# Patient Record
Sex: FEMALE | Race: ASIAN | NOT HISPANIC OR LATINO | URBAN - METROPOLITAN AREA
[De-identification: names, ages, dates, MRNs, and addresses within clinical notes are randomized per-mention and may not be internally consistent; named-entity substitution may affect disease eponyms.]

---

## 2021-07-16 ENCOUNTER — INPATIENT (INPATIENT)
Facility: HOSPITAL | Age: 31
LOS: 1 days | Discharge: HOME | End: 2021-07-18
Attending: FAMILY MEDICINE | Admitting: FAMILY MEDICINE
Payer: SELF-PAY

## 2021-07-16 VITALS
DIASTOLIC BLOOD PRESSURE: 87 MMHG | RESPIRATION RATE: 16 BRPM | WEIGHT: 145.06 LBS | OXYGEN SATURATION: 98 % | HEIGHT: 60 IN | SYSTOLIC BLOOD PRESSURE: 125 MMHG | TEMPERATURE: 98 F | HEART RATE: 91 BPM

## 2021-07-16 DIAGNOSIS — I27.82 CHRONIC PULMONARY EMBOLISM: ICD-10-CM

## 2021-07-16 DIAGNOSIS — Z90.49 ACQUIRED ABSENCE OF OTHER SPECIFIED PARTS OF DIGESTIVE TRACT: Chronic | ICD-10-CM

## 2021-07-16 DIAGNOSIS — I26.99 OTHER PULMONARY EMBOLISM WITHOUT ACUTE COR PULMONALE: ICD-10-CM

## 2021-07-16 LAB
ALBUMIN SERPL ELPH-MCNC: 3.8 G/DL — SIGNIFICANT CHANGE UP (ref 3.3–5)
ALP SERPL-CCNC: 75 U/L — SIGNIFICANT CHANGE UP (ref 40–120)
ALT FLD-CCNC: 12 U/L — SIGNIFICANT CHANGE UP (ref 12–78)
ANION GAP SERPL CALC-SCNC: 6 MMOL/L — SIGNIFICANT CHANGE UP (ref 5–17)
AST SERPL-CCNC: 15 U/L — SIGNIFICANT CHANGE UP (ref 15–37)
BASOPHILS # BLD AUTO: 0.03 K/UL — SIGNIFICANT CHANGE UP (ref 0–0.2)
BASOPHILS NFR BLD AUTO: 0.4 % — SIGNIFICANT CHANGE UP (ref 0–2)
BILIRUB SERPL-MCNC: 0.3 MG/DL — SIGNIFICANT CHANGE UP (ref 0.2–1.2)
BUN SERPL-MCNC: 16 MG/DL — SIGNIFICANT CHANGE UP (ref 7–23)
CALCIUM SERPL-MCNC: 9.3 MG/DL — SIGNIFICANT CHANGE UP (ref 8.5–10.1)
CHLORIDE SERPL-SCNC: 101 MMOL/L — SIGNIFICANT CHANGE UP (ref 96–108)
CO2 SERPL-SCNC: 30 MMOL/L — SIGNIFICANT CHANGE UP (ref 22–31)
COVID-19 SPIKE DOMAIN AB INTERP: POSITIVE
COVID-19 SPIKE DOMAIN ANTIBODY RESULT: >250 U/ML — HIGH
CREAT SERPL-MCNC: 0.8 MG/DL — SIGNIFICANT CHANGE UP (ref 0.5–1.3)
D DIMER BLD IA.RAPID-MCNC: <150 NG/ML DDU — SIGNIFICANT CHANGE UP
EOSINOPHIL # BLD AUTO: 0.12 K/UL — SIGNIFICANT CHANGE UP (ref 0–0.5)
EOSINOPHIL NFR BLD AUTO: 1.6 % — SIGNIFICANT CHANGE UP (ref 0–6)
FLUAV AG NPH QL: SIGNIFICANT CHANGE UP
FLUBV AG NPH QL: SIGNIFICANT CHANGE UP
GLUCOSE SERPL-MCNC: 111 MG/DL — HIGH (ref 70–99)
HCG SERPL-ACNC: <1 MIU/ML — SIGNIFICANT CHANGE UP
HCT VFR BLD CALC: 38.8 % — SIGNIFICANT CHANGE UP (ref 34.5–45)
HCYS SERPL-MCNC: 6.7 UMOL/L — SIGNIFICANT CHANGE UP
HGB BLD-MCNC: 12.8 G/DL — SIGNIFICANT CHANGE UP (ref 11.5–15.5)
IMM GRANULOCYTES NFR BLD AUTO: 1 % — SIGNIFICANT CHANGE UP (ref 0–1.5)
LYMPHOCYTES # BLD AUTO: 3.08 K/UL — SIGNIFICANT CHANGE UP (ref 1–3.3)
LYMPHOCYTES # BLD AUTO: 42.2 % — SIGNIFICANT CHANGE UP (ref 13–44)
MAGNESIUM SERPL-MCNC: 2.3 MG/DL — SIGNIFICANT CHANGE UP (ref 1.6–2.6)
MCHC RBC-ENTMCNC: 29.9 PG — SIGNIFICANT CHANGE UP (ref 27–34)
MCHC RBC-ENTMCNC: 33 GM/DL — SIGNIFICANT CHANGE UP (ref 32–36)
MCV RBC AUTO: 90.7 FL — SIGNIFICANT CHANGE UP (ref 80–100)
MONOCYTES # BLD AUTO: 0.54 K/UL — SIGNIFICANT CHANGE UP (ref 0–0.9)
MONOCYTES NFR BLD AUTO: 7.4 % — SIGNIFICANT CHANGE UP (ref 2–14)
NEUTROPHILS # BLD AUTO: 3.46 K/UL — SIGNIFICANT CHANGE UP (ref 1.8–7.4)
NEUTROPHILS NFR BLD AUTO: 47.4 % — SIGNIFICANT CHANGE UP (ref 43–77)
NRBC # BLD: 0 /100 WBCS — SIGNIFICANT CHANGE UP (ref 0–0)
NT-PROBNP SERPL-SCNC: 12 PG/ML — SIGNIFICANT CHANGE UP (ref 0–125)
PLATELET # BLD AUTO: 288 K/UL — SIGNIFICANT CHANGE UP (ref 150–400)
POTASSIUM SERPL-MCNC: 3.5 MMOL/L — SIGNIFICANT CHANGE UP (ref 3.5–5.3)
POTASSIUM SERPL-SCNC: 3.5 MMOL/L — SIGNIFICANT CHANGE UP (ref 3.5–5.3)
PROT SERPL-MCNC: 8.4 GM/DL — HIGH (ref 6–8.3)
RAPID RVP RESULT: SIGNIFICANT CHANGE UP
RBC # BLD: 4.28 M/UL — SIGNIFICANT CHANGE UP (ref 3.8–5.2)
RBC # FLD: 12.7 % — SIGNIFICANT CHANGE UP (ref 10.3–14.5)
SARS-COV-2 IGG+IGM SERPL QL IA: >250 U/ML — HIGH
SARS-COV-2 IGG+IGM SERPL QL IA: POSITIVE
SARS-COV-2 RNA SPEC QL NAA+PROBE: SIGNIFICANT CHANGE UP
SARS-COV-2 RNA SPEC QL NAA+PROBE: SIGNIFICANT CHANGE UP
SODIUM SERPL-SCNC: 137 MMOL/L — SIGNIFICANT CHANGE UP (ref 135–145)
WBC # BLD: 7.3 K/UL — SIGNIFICANT CHANGE UP (ref 3.8–10.5)
WBC # FLD AUTO: 7.3 K/UL — SIGNIFICANT CHANGE UP (ref 3.8–10.5)

## 2021-07-16 PROCEDURE — 99053 MED SERV 10PM-8AM 24 HR FAC: CPT

## 2021-07-16 PROCEDURE — 93010 ELECTROCARDIOGRAM REPORT: CPT

## 2021-07-16 PROCEDURE — 99285 EMERGENCY DEPT VISIT HI MDM: CPT

## 2021-07-16 PROCEDURE — 99222 1ST HOSP IP/OBS MODERATE 55: CPT

## 2021-07-16 PROCEDURE — 93306 TTE W/DOPPLER COMPLETE: CPT | Mod: 26

## 2021-07-16 PROCEDURE — 93970 EXTREMITY STUDY: CPT | Mod: 26

## 2021-07-16 PROCEDURE — 78582 LUNG VENTILAT&PERFUS IMAGING: CPT | Mod: 26

## 2021-07-16 PROCEDURE — 71046 X-RAY EXAM CHEST 2 VIEWS: CPT | Mod: 26

## 2021-07-16 PROCEDURE — 71275 CT ANGIOGRAPHY CHEST: CPT | Mod: 26,MA

## 2021-07-16 RX ORDER — ALBUTEROL 90 UG/1
2.5 AEROSOL, METERED ORAL ONCE
Refills: 0 | Status: COMPLETED | OUTPATIENT
Start: 2021-07-16 | End: 2021-07-16

## 2021-07-16 RX ORDER — CODEINE SULFATE 60 MG/1
30 TABLET ORAL ONCE
Refills: 0 | Status: DISCONTINUED | OUTPATIENT
Start: 2021-07-16 | End: 2021-07-16

## 2021-07-16 RX ORDER — CODEINE SULFATE 60 MG/1
60 TABLET ORAL ONCE
Refills: 0 | Status: DISCONTINUED | OUTPATIENT
Start: 2021-07-16 | End: 2021-07-16

## 2021-07-16 RX ORDER — ENOXAPARIN SODIUM 100 MG/ML
60 INJECTION SUBCUTANEOUS ONCE
Refills: 0 | Status: DISCONTINUED | OUTPATIENT
Start: 2021-07-16 | End: 2021-07-16

## 2021-07-16 RX ORDER — ENOXAPARIN SODIUM 100 MG/ML
60 INJECTION SUBCUTANEOUS
Refills: 0 | Status: DISCONTINUED | OUTPATIENT
Start: 2021-07-16 | End: 2021-07-17

## 2021-07-16 RX ADMIN — ALBUTEROL 2.5 MILLIGRAM(S): 90 AEROSOL, METERED ORAL at 05:50

## 2021-07-16 RX ADMIN — ALBUTEROL 2.5 MILLIGRAM(S): 90 AEROSOL, METERED ORAL at 05:30

## 2021-07-16 RX ADMIN — CODEINE SULFATE 30 MILLIGRAM(S): 60 TABLET ORAL at 07:46

## 2021-07-16 RX ADMIN — ENOXAPARIN SODIUM 60 MILLIGRAM(S): 100 INJECTION SUBCUTANEOUS at 18:34

## 2021-07-16 RX ADMIN — ALBUTEROL 2.5 MILLIGRAM(S): 90 AEROSOL, METERED ORAL at 06:11

## 2021-07-16 NOTE — ED ADULT NURSE NOTE - OBJECTIVE STATEMENT
Patient alert and oriented x4, complaining of nonproductive cough for the past three weeks. Denies fevers, chills, difficulty breathing, n/v/d. PMH GERD, PSH appendectomy. NKA. Patient alert and oriented x4, complaining of nonproductive cough for the past three weeks. Denies fevers, chills, chest pain/tightness, difficulty breathing, n/v/d.  PMH GERD, PSH appendectomy. NKA. Patient alert and oriented x4, complaining of nonproductive cough for the past three weeks. Cough worsens when lying down. Denies fevers, chills, chest pain/tightness, difficulty breathing, n/v/d.  LMP unknown, patient states she has an IUD. PMH GERD, PSH appendectomy. NKA.

## 2021-07-16 NOTE — ED PROVIDER NOTE - PHYSICAL EXAMINATION
Gen: Alert, NAD  Head: NC, AT   Eyes: PERRL, EOMI, normal lids/conjunctiva  ENT: normal hearing, patent oropharynx without erythema/exudate, uvula midline  Neck: supple, no tenderness, Trachea midline  Pulm: Bilateral BS, normal resp effort, no wheeze/stridor/retractions. persistent cough, dry  CV: RRR, no M/R/G, 2+ radial and dp pulses bl, no edema  Abd: soft, NT/ND, +BS, no hepatosplenomegaly  Mskel: extremities x4 with normal ROM and no joint effusions. no ctl spine ttp.   Skin: no rash, no bruising   Neuro: AAOx3, no sensory/motor deficits, CN 2-12 intact

## 2021-07-16 NOTE — CONSULT NOTE ADULT - SUBJECTIVE AND OBJECTIVE BOX
FRANNIE COOLEY    [facility] ED    Patient is a 31y old  Female who presents with a chief complaint of      Allergies    No Known Allergies    Intolerances        HPI:      PAST MEDICAL & SURGICAL HISTORY:  No pertinent past medical history    History of appendectomy        FAMILY HISTORY:        MEDICATIONS   enoxaparin Injectable 60 milliGRAM(s) SubCutaneous Once      Vital Signs Last 24 Hrs  T(C): 36.4 (16 Jul 2021 06:33), Max: 36.9 (16 Jul 2021 05:07)  T(F): 97.6 (16 Jul 2021 06:33), Max: 98.5 (16 Jul 2021 05:07)  HR: 90 (16 Jul 2021 06:33) (85 - 91)  BP: 115/73 (16 Jul 2021 06:33) (105/72 - 125/87)  BP(mean): --  RR: 16 (16 Jul 2021 06:33) (15 - 16)  SpO2: 100% (16 Jul 2021 06:33) (98% - 100%)            LABS:                        12.8   7.30  )-----------( 288      ( 16 Jul 2021 05:24 )             38.8     07-16    137  |  101  |  16  ----------------------------<  111<H>  3.5   |  30  |  0.80    Ca    9.3      16 Jul 2021 05:24  Mg     2.3     07-16    TPro  8.4<H>  /  Alb  3.8  /  TBili  0.3  /  DBili  x   /  AST  15  /  ALT  12  /  AlkPhos  75  07-16              WBC:  WBC Count: 7.30 K/uL (07-16 @ 05:24)      MICROBIOLOGY:  RECENT CULTURES:                  Sodium:  Sodium, Serum: 137 mmol/L (07-16 @ 05:24)      0.80 mg/dL 07-16 @ 05:24      Hemoglobin:  Hemoglobin: 12.8 g/dL (07-16 @ 05:24)      Platelets: Platelet Count - Automated: 288 K/uL (07-16 @ 05:24)      LIVER FUNCTIONS - ( 16 Jul 2021 05:24 )  Alb: 3.8 g/dL / Pro: 8.4 gm/dL / ALK PHOS: 75 U/L / ALT: 12 U/L / AST: 15 U/L / GGT: x                 RADIOLOGY & ADDITIONAL STUDIES:

## 2021-07-16 NOTE — CONSULT NOTE ADULT - ASSESSMENT
YASIR KATHLEEN 31 F 7/16/2021 [address] DR ROBERT MADRIGAL     Initial evaluation/Pulmonary Critical Care consultation requested on 7/16/2021  by Dr SMITH  from Dr Li   Patient examined chart reviewed    HOSPITAL ADMISSION   PATIENT CAME  FROM (if information available)      YASIR KATHLEEN 31 F 7/16/2021 1992 DR ROBERT MADRIGAL     REVIEW OF SYMPTOMS      Able to give ROS  Yes     RELIABLE +/-   CONSTITUTIONAL Weakness Yes  Chills No   ENDOCRINE  No heat or cold intolerance    ALLERGY No hives  Sore throat No stridor  RESP Coughing blood no  Shortness of breath YES   NEURO No Headache  Confusion Pain neck No   CARDIAC No Chest pain No Palpitations   GI  Pain abdomen NO   Vomiting NO     PHYSICAL EXAM    HEENT Unremarkable  atraumatic   RESP Fair air entry EXP prolonged    Harsh breath sound Resp distres mild   CARDIAC S1 S2 No S3     NO JVD    ABDOMEN SOFT BS PRESENT NOT DISTENDED No hepatosplenomegaly PEDAL EDEMA present No calf tenderness  NO rash       PATIENT PRESENTATION.   31F who has a remote hx of appendectomy pw cough. patient notes cough x3 weeks without any fever, chills, sob, chest pain or myalgias. does not remember having a cold. did get covid in december 2020 and then completed pfizer vaccine jan 2021. patient notes symptoms are worse when sleeping/supine. pt has not sought treatment for this. ros neg for ha, vision loss, rhinorrhea, abd pain, vomiting, rash, dysuria, numbness. patient says she cannot be pregnant as she has iud. pt denies smoking anything  Pt found to have PE on cta ch and Pulm consulted 7/16/2021 7/16/2021 ADMISSION CC.   31 f HO COVID 2020 then vaccine 1/2021 cc cough 3 w     7/16/2021 PRESENTING PROBLEMS .   CHRONIC COUGH  ABNORMAL CTA  CHR PE       ADVANCED DIRECTIVES.                            COVID STATUS.    scv2 7/16/2021 (-)       PROBLEM DATA.    INFECTION  W 7/16/2021 w 7.3  flab 7/16/2021 (-)   RVP 7/16/2021 (-)   VTE  BNP 7/16/2021 BNP 12    cta ch 7/16/2021 no ac pe suspect chronic embolus distal r main pulm artery No pulm hytn   RENAL   Cr 7/16/2021 Cr .8   PREGNANCY STATUS  HCG 7/16/2021 (-)       PATIENT DATA                ABG.                 OXYGENATION.       Ra 7/16/2021            VITALS/IO/VENT/DRIPS.     7/16/2021  afeb 90 115/70     ASSESSMENT/RECOMMENDATIONS.    CHRONIC COUGH   Check for asthma Check ige AEC   Should see me in offic e Call    Started symbicort 160   VTE  BNP 7/16/2021 BNP 12    cta ch 7/16/2021 no ac pe suspect chronic embolus distal r main pulm artery No pulm hytn   Patient has 2 risk factors   1) Is on hormonal birth control as per history  2) Had COVID 2020   So will manage as pe Started fd lvnx   Check vq scan to look for cteph  Check V duplx   Start thrombophilia barrios  Check echo   Will follow         TIME SPENT   Over 55 minutes aggregate care time spent on encounter; activities included   direct patient care, counseling and/or coordinating care reviewing notes, lab data/ imaging , discussion with multidisciplinary team/ patient  /family and explaining in detail risks, benefits, alternatives  of the recommendations     YASIR KATHLEEN 31 F 7/16/2021 1992 DR ROBERT MADRIGAL

## 2021-07-16 NOTE — ED PROVIDER NOTE - OBJECTIVE STATEMENT
31F who has a remote hx of appendectomy pw cough. patient notes cough x3 weeks without any fever, chills, sob, chest pain or myalgias. does not remember having a cold. did get covid in december 2020 and then completed KXEN vaccine jan 2021. patient notes symptoms are worse when sleeping/supine. pt has not sought treatment for this. ros neg for ha, vision loss, rhinorrhea, abd pain, vomiting, rash, dysuria, numbness. patient says she cannot be pregnant as she has iud. pt denies smoking anything

## 2021-07-16 NOTE — H&P ADULT - PROBLEM SELECTOR PLAN 1
Patient newly diagnosed PE, likely causing her cough for the last few weeks. As per CT report does not appear to have right heart strain or pulmonary htn  Pulmonary consulted  Patient placed on Lovenox  Now pending venous duplex, vq scan and echo  Thrombophilia workup possibly confounded when starting AC    Case discussed and patient care endorsed to . Patient newly diagnosed PE, likely causing her cough for the last few weeks. As per CT report does not appear to have right heart strain or pulmonary htn.  Pulmonary consulted, placed on Lovenox 1 mg /kg bid. Venous duplex  negative, Thrombophilia workup possibly confounded when starting AC, was ordered.     PE confirmed by Dr. Klein. PE confirmed by Dr. Klein, agree with above H&P. Patient newly diagnosed PE, likely causing her cough for the last few weeks. As per CT report does not appear to have right heart strain or pulmonary htn.  Pulmonary consulted, placed on Lovenox 1 mg /kg bid. Venous duplex  negative, Thrombophilia workup possibly confounded when starting AC, was ordered.       PE confirmed by Dr. Klein. PE confirmed by Dr. Klein, agree with above H&P.  PE appears unprovoked. Patient newly diagnosed PE, likely causing her cough for the last few weeks. As per CT report does not appear to have right heart strain or pulmonary htn.  Pulmonary consulted, placed on Lovenox 1 mg /kg bid. Venous duplex  negative, Thrombophilia workup possibly confounded when starting AC, was ordered.       PE confirmed by Dr. Klein. PE confirmed by Dr. Klein, agree with above H&P.  PE appears unprovoked.     Change to NOAC and discharge home in AM.

## 2021-07-16 NOTE — ED PROVIDER NOTE - CLINICAL SUMMARY MEDICAL DECISION MAKING FREE TEXT BOX
pt pw cough, likely related to cough variant asthma. rule out lung ca, pe. possibly gerd. pt pw cough, likely related to cough variant asthma. rule out lung ca, pe. possibly gerd.  cta shows pe, chronic. will admit. case dw pulm dr yang

## 2021-07-16 NOTE — H&P ADULT - NSHPLABSRESULTS_GEN_ALL_CORE
LABS:                        12.8   7.30  )-----------( 288      ( 16 Jul 2021 05:24 )             38.8     07-16    137  |  101  |  16  ----------------------------<  111<H>  3.5   |  30  |  0.80    Ca    9.3      16 Jul 2021 05:24  Mg     2.3     07-16    TPro  8.4<H>  /  Alb  3.8  /  TBili  0.3  /  DBili  x   /  AST  15  /  ALT  12  /  AlkPhos  75  07-16            RADIOLOGY & ADDITIONAL TESTS:

## 2021-07-16 NOTE — H&P ADULT - HISTORY OF PRESENT ILLNESS
Luisa Pollock is a 31 year old lady who had covid in Dec 2020, pfiezer vaccine in January, recently came back from a trip from [place] who presents with complaints of coughing for the last several weeks. The cough is w/o chest pain, f/c/n/v, she denies any recent colds. The patient also denies smoking.         PAST MEDICAL & SURGICAL HISTORY:  No pertinent past medical history    History of appendectomy        Review of Systems:   CONSTITUTIONAL: No fever, weight loss, or fatigue  EYES: No eye pain, visual disturbances, or discharge  ENMT:  No difficulty hearing, tinnitus, vertigo; No sinus or throat pain  NECK: No pain or stiffness  RESPIRATORY: Cough, no wheezing, chills or hemoptysis; No shortness of breath  CARDIOVASCULAR: No chest pain, palpitations, dizziness, or leg swelling  GASTROINTESTINAL: No abdominal or epigastric pain. No nausea, vomiting, or hematemesis; No diarrhea or constipation. No melena or hematochezia.  GENITOURINARY: No dysuria, frequency, hematuria, or incontinence  NEUROLOGICAL: No headaches, memory loss, loss of strength, numbness, or tremors  SKIN: No itching, burning, rashes, or lesions   LYMPH NODES: No enlarged glands  ENDOCRINE: No heat or cold intolerance; No hair loss  MUSCULOSKELETAL: No joint pain or swelling; No muscle, back, or extremity pain  PSYCHIATRIC: No depression, anxiety, mood swings, or difficulty sleeping  HEME/LYMPH: No easy bruising, or bleeding gums  ALLERGY AND IMMUNOLOGIC: No hives or eczema    Allergies    No Known Allergies    Intolerances        Social History:     FAMILY HISTORY:      MEDICATIONS  (STANDING):  enoxaparin Injectable 60 milliGRAM(s) SubCutaneous Once    MEDICATIONS  (PRN):      T(C): 36.4 (07-16-21 @ 06:33), Max: 36.9 (07-16-21 @ 05:07)  HR: 108 (07-16-21 @ 08:15) (85 - 108)  BP: 111/68 (07-16-21 @ 08:15) (105/72 - 125/87)  RR: 15 (07-16-21 @ 08:15) (15 - 16)  SpO2: 99% (07-16-21 @ 08:15) (98% - 100%)  Height (cm): 152.4 (07-16-21 @ 05:07)  Weight (kg): 65.8 (07-16-21 @ 05:07)  BMI (kg/m2): 28.3 (07-16-21 @ 05:07)  BSA (m2): 1.63 (07-16-21 @ 05:07)  CAPILLARY BLOOD GLUCOSE        I&O's Summary      LABS:                        12.8   7.30  )-----------( 288      ( 16 Jul 2021 05:24 )             38.8     07-16    137  |  101  |  16  ----------------------------<  111<H>  3.5   |  30  |  0.80    Ca    9.3      16 Jul 2021 05:24  Mg     2.3     07-16    TPro  8.4<H>  /  Alb  3.8  /  TBili  0.3  /  DBili  x   /  AST  15  /  ALT  12  /  AlkPhos  75  07-16                RADIOLOGY & ADDITIONAL TESTS:    ECG Personally Reviewed -     Imaging Personally Reviewed:    Consultant(s) Notes Reviewed:      Care Discussed with Consultants/Other Providers:   Luisa Pollock is a 31 year old lady who had covid in Dec 2020, pfiezer vaccine in January, recently came back from a trip from [place] who presents with complaints of coughing for the last several weeks. The cough is w/o chest pain, f/c/n/v, she denies any recent colds. The patient also denies smoking.         PAST MEDICAL & SURGICAL HISTORY:  No pertinent past medical history    History of appendectomy        Review of Systems:   CONSTITUTIONAL: No fever, weight loss, or fatigue  EYES: No eye pain, visual disturbances, or discharge  ENMT:  No difficulty hearing, tinnitus, vertigo; No sinus or throat pain  NECK: No pain or stiffness  RESPIRATORY: Cough, no wheezing, chills or hemoptysis; No shortness of breath  CARDIOVASCULAR: No chest pain, palpitations, dizziness, or leg swelling  GASTROINTESTINAL: No abdominal or epigastric pain. No nausea, vomiting, or hematemesis; No diarrhea or constipation. No melena or hematochezia.  GENITOURINARY: No dysuria, frequency, hematuria, or incontinence  NEUROLOGICAL: No headaches, memory loss, loss of strength, numbness, or tremors  SKIN: No itching, burning, rashes, or lesions   LYMPH NODES: No enlarged glands  ENDOCRINE: No heat or cold intolerance; No hair loss  MUSCULOSKELETAL: No joint pain or swelling; No muscle, back, or extremity pain  PSYCHIATRIC: No depression, anxiety, mood swings, or difficulty sleeping  HEME/LYMPH: No easy bruising, or bleeding gums  ALLERGY AND IMMUNOLOGIC: No hives or eczema    Allergies    No Known Allergies    Intolerances        Social History: Occasionally uses a vape pen, last used a few weeks ago. Denies heavy drinking or drug use    FAMILY HISTORY:  Maternal grandfather had bladder cancer.  Patient is not aware of any clotting issues in the family.     MEDICATIONS  (STANDING):  enoxaparin Injectable 60 milliGRAM(s) SubCutaneous Once    MEDICATIONS  (PRN):      T(C): 36.4 (07-16-21 @ 06:33), Max: 36.9 (07-16-21 @ 05:07)  HR: 108 (07-16-21 @ 08:15) (85 - 108)  BP: 111/68 (07-16-21 @ 08:15) (105/72 - 125/87)  RR: 15 (07-16-21 @ 08:15) (15 - 16)  SpO2: 99% (07-16-21 @ 08:15) (98% - 100%)  Height (cm): 152.4 (07-16-21 @ 05:07)  Weight (kg): 65.8 (07-16-21 @ 05:07)  BMI (kg/m2): 28.3 (07-16-21 @ 05:07)  BSA (m2): 1.63 (07-16-21 @ 05:07)  CAPILLARY BLOOD GLUCOSE        I&O's Summary      LABS:                        12.8   7.30  )-----------( 288      ( 16 Jul 2021 05:24 )             38.8     07-16    137  |  101  |  16  ----------------------------<  111<H>  3.5   |  30  |  0.80    Ca    9.3      16 Jul 2021 05:24  Mg     2.3     07-16    TPro  8.4<H>  /  Alb  3.8  /  TBili  0.3  /  DBili  x   /  AST  15  /  ALT  12  /  AlkPhos  75  07-16                RADIOLOGY & ADDITIONAL TESTS:    ECG Personally Reviewed -     Imaging Personally Reviewed:    Consultant(s) Notes Reviewed:      Care Discussed with Consultants/Other Providers:   Luisa Pollock is a 31 year old lady who had covid in Dec 2020, pfiezer vaccine in January, recently came back from a trip from [place] who presents with complaints of coughing for the last several weeks. The cough is w/o chest pain, f/c/n/v, she denies any recent colds. The patient also denies smoking. Patient states that her trips were in May and the cough started afterwards.         PAST MEDICAL & SURGICAL HISTORY:  No pertinent past medical history    History of appendectomy        Review of Systems:   CONSTITUTIONAL: No fever, weight loss, or fatigue  EYES: No eye pain, visual disturbances, or discharge  ENMT:  No difficulty hearing, tinnitus, vertigo; No sinus or throat pain  NECK: No pain or stiffness  RESPIRATORY: Cough, no wheezing, chills or hemoptysis; No shortness of breath  CARDIOVASCULAR: No chest pain, palpitations, dizziness, or leg swelling  GASTROINTESTINAL: No abdominal or epigastric pain. No nausea, vomiting, or hematemesis; No diarrhea or constipation. No melena or hematochezia.  GENITOURINARY: No dysuria, frequency, hematuria, or incontinence  NEUROLOGICAL: No headaches, memory loss, loss of strength, numbness, or tremors  SKIN: No itching, burning, rashes, or lesions   LYMPH NODES: No enlarged glands  ENDOCRINE: No heat or cold intolerance; No hair loss  MUSCULOSKELETAL: No joint pain or swelling; No muscle, back, or extremity pain  PSYCHIATRIC: No depression, anxiety, mood swings, or difficulty sleeping  HEME/LYMPH: No easy bruising, or bleeding gums  ALLERGY AND IMMUNOLOGIC: No hives or eczema    Allergies    No Known Allergies    Intolerances        Social History: Occasionally uses a vape pen, last used a few weeks ago. Denies heavy drinking or drug use    FAMILY HISTORY:  Maternal grandfather had bladder cancer.  Patient is not aware of any clotting issues in the family.     MEDICATIONS  (STANDING):  enoxaparin Injectable 60 milliGRAM(s) SubCutaneous Once    MEDICATIONS  (PRN):      T(C): 36.4 (07-16-21 @ 06:33), Max: 36.9 (07-16-21 @ 05:07)  HR: 108 (07-16-21 @ 08:15) (85 - 108)  BP: 111/68 (07-16-21 @ 08:15) (105/72 - 125/87)  RR: 15 (07-16-21 @ 08:15) (15 - 16)  SpO2: 99% (07-16-21 @ 08:15) (98% - 100%)  Height (cm): 152.4 (07-16-21 @ 05:07)  Weight (kg): 65.8 (07-16-21 @ 05:07)  BMI (kg/m2): 28.3 (07-16-21 @ 05:07)  BSA (m2): 1.63 (07-16-21 @ 05:07)  CAPILLARY BLOOD GLUCOSE        I&O's Summary      LABS:                        12.8   7.30  )-----------( 288      ( 16 Jul 2021 05:24 )             38.8     07-16    137  |  101  |  16  ----------------------------<  111<H>  3.5   |  30  |  0.80    Ca    9.3      16 Jul 2021 05:24  Mg     2.3     07-16    TPro  8.4<H>  /  Alb  3.8  /  TBili  0.3  /  DBili  x   /  AST  15  /  ALT  12  /  AlkPhos  75  07-16                RADIOLOGY & ADDITIONAL TESTS:    ECG Personally Reviewed -     Imaging Personally Reviewed:    Consultant(s) Notes Reviewed:      Care Discussed with Consultants/Other Providers:

## 2021-07-16 NOTE — ED ADULT NURSE NOTE - ED STAT RN HANDOFF DETAILS
Handoff given to RN Armand. Patient is awake, alert and oriented x4. IV access right AC 20g. Safety and environmental checks maintained. Patient has no further complaints at this time. Plan of care endorsed to incoming RN (pending test results).

## 2021-07-16 NOTE — H&P ADULT - NSHPPHYSICALEXAM_GEN_ALL_CORE
tele health precludes physical exam. Patient interviewed on video conference. PHYSICAL EXAMINATION:  Vital Signs Last 24 Hrs  T(C): 36.4 (16 Jul 2021 06:33), Max: 36.9 (16 Jul 2021 05:07)  T(F): 97.6 (16 Jul 2021 06:33), Max: 98.5 (16 Jul 2021 05:07)  HR: 108 (16 Jul 2021 08:15) (85 - 108)  BP: 111/68 (16 Jul 2021 08:15) (105/72 - 125/87)  BP(mean): --  RR: 15 (16 Jul 2021 08:15) (15 - 16)  SpO2: 99% (16 Jul 2021 08:15) (98% - 100%)  CAPILLARY BLOOD GLUCOSE          GENERAL: NAD, well-groomed, well-developed  HEAD:  atraumatic, normocephalic  EYES: sclera anicteric  ENMT: mucous membranes moist  NECK: supple, No JVD  CHEST/LUNG: clear to auscultation bilaterally; no rales, rhonchi, or wheezing b/l  HEART: normal S1, S2  ABDOMEN: BS+, soft, ND, NT   EXTREMITIES:  pulses palpable; no clubbing, cyanosis, or edema b/l LEs  NEURO: awake, alert, interactive; moves all extremities  SKIN: no rashes or lesions PHYSICAL EXAMINATION:  Vital Signs Last 24 Hrs  T(C): 36.4 (16 Jul 2021 06:33), Max: 36.9 (16 Jul 2021 05:07)  T(F): 97.6 (16 Jul 2021 06:33), Max: 98.5 (16 Jul 2021 05:07)  HR: 108 (16 Jul 2021 08:15) (85 - 108)  BP: 111/68 (16 Jul 2021 08:15) (105/72 - 125/87)  BP(mean): --  RR: 15 (16 Jul 2021 08:15) (15 - 16)  SpO2: 99% (16 Jul 2021 08:15) (98% - 100%)  CAPILLARY BLOOD GLUCOSE          GENERAL: NAD, seen on 2 E, comfortable on RA, no fevers or SOB  HEAD:  atraumatic, normocephalic  EYES: sclera anicteric  ENMT: mucous membranes moist  NECK: supple, No JVD  CHEST/LUNG: clear to auscultation bilaterally; no rales, rhonchi, or wheezing b/l  HEART: normal S1, S2  ABDOMEN: BS+, soft, ND, NT   EXTREMITIES:  pulses palpable; no clubbing, cyanosis, or edema b/l LEs  NEURO: awake, alert, interactive; moves all extremities  SKIN: no rashes or lesions

## 2021-07-16 NOTE — ED ADULT NURSE REASSESSMENT NOTE - NS ED NURSE REASSESS COMMENT FT1
Received pt in stable condition coughing. Denies any improvement. Codeine given. Continue to monitor pt.

## 2021-07-17 LAB
EOSINOPHIL # BLD: 50 /UL — SIGNIFICANT CHANGE UP (ref 50–350)
HCT VFR BLD CALC: 37.2 % — SIGNIFICANT CHANGE UP (ref 34.5–45)
HGB BLD-MCNC: 12 G/DL — SIGNIFICANT CHANGE UP (ref 11.5–15.5)
IGE SERPL-ACNC: 42 KU/L — SIGNIFICANT CHANGE UP
MCHC RBC-ENTMCNC: 29.7 PG — SIGNIFICANT CHANGE UP (ref 27–34)
MCHC RBC-ENTMCNC: 32.3 GM/DL — SIGNIFICANT CHANGE UP (ref 32–36)
MCV RBC AUTO: 92.1 FL — SIGNIFICANT CHANGE UP (ref 80–100)
NRBC # BLD: 0 /100 WBCS — SIGNIFICANT CHANGE UP (ref 0–0)
PLATELET # BLD AUTO: 236 K/UL — SIGNIFICANT CHANGE UP (ref 150–400)
RBC # BLD: 4.04 M/UL — SIGNIFICANT CHANGE UP (ref 3.8–5.2)
RBC # FLD: 12.7 % — SIGNIFICANT CHANGE UP (ref 10.3–14.5)
WBC # BLD: 6.37 K/UL — SIGNIFICANT CHANGE UP (ref 3.8–10.5)
WBC # FLD AUTO: 6.37 K/UL — SIGNIFICANT CHANGE UP (ref 3.8–10.5)

## 2021-07-17 PROCEDURE — 99232 SBSQ HOSP IP/OBS MODERATE 35: CPT

## 2021-07-17 RX ORDER — RIVAROXABAN 15 MG-20MG
15 KIT ORAL
Refills: 0 | Status: DISCONTINUED | OUTPATIENT
Start: 2021-07-17 | End: 2021-07-18

## 2021-07-17 RX ADMIN — RIVAROXABAN 15 MILLIGRAM(S): KIT at 17:33

## 2021-07-17 RX ADMIN — ENOXAPARIN SODIUM 60 MILLIGRAM(S): 100 INJECTION SUBCUTANEOUS at 05:26

## 2021-07-17 RX ADMIN — Medication 100 MILLIGRAM(S): at 05:24

## 2021-07-17 RX ADMIN — Medication 100 MILLIGRAM(S): at 14:48

## 2021-07-17 RX ADMIN — Medication 100 MILLIGRAM(S): at 21:44

## 2021-07-17 NOTE — PROGRESS NOTE ADULT - SUBJECTIVE AND OBJECTIVE BOX
LUISA COOLEY    [facility] [address]E [address]    Patient is a 31y old  Female who presents with a chief complaint of cough x3 weeks (16 Jul 2021 08:45)       Allergies    No Known Allergies    Intolerances        HPI:  Luisa Cooley is a 31 year old lady who had covid in Dec 2020, pfiezer vaccine in January, recently came back from a trip from [place] who presents with complaints of coughing for the last several weeks. The cough is w/o chest pain, f/c/n/v, she denies any recent colds. The patient also denies smoking. Patient states that her trips were in May and the cough started afterwards.         PAST MEDICAL & SURGICAL HISTORY:  No pertinent past medical history    History of appendectomy        Review of Systems:   CONSTITUTIONAL: No fever, weight loss, or fatigue  EYES: No eye pain, visual disturbances, or discharge  ENMT:  No difficulty hearing, tinnitus, vertigo; No sinus or throat pain  NECK: No pain or stiffness  RESPIRATORY: Cough, no wheezing, chills or hemoptysis; No shortness of breath  CARDIOVASCULAR: No chest pain, palpitations, dizziness, or leg swelling  GASTROINTESTINAL: No abdominal or epigastric pain. No nausea, vomiting, or hematemesis; No diarrhea or constipation. No melena or hematochezia.  GENITOURINARY: No dysuria, frequency, hematuria, or incontinence  NEUROLOGICAL: No headaches, memory loss, loss of strength, numbness, or tremors  SKIN: No itching, burning, rashes, or lesions   LYMPH NODES: No enlarged glands  ENDOCRINE: No heat or cold intolerance; No hair loss  MUSCULOSKELETAL: No joint pain or swelling; No muscle, back, or extremity pain  PSYCHIATRIC: No depression, anxiety, mood swings, or difficulty sleeping  HEME/LYMPH: No easy bruising, or bleeding gums  ALLERGY AND IMMUNOLOGIC: No hives or eczema    Allergies    No Known Allergies    Intolerances        Social History: Occasionally uses a vape pen, last used a few weeks ago. Denies heavy drinking or drug use    FAMILY HISTORY:  Maternal grandfather had bladder cancer.  Patient is not aware of any clotting issues in the family.     MEDICATIONS  (STANDING):  enoxaparin Injectable 60 milliGRAM(s) SubCutaneous Once    MEDICATIONS  (PRN):      T(C): 36.4 (07-16-21 @ 06:33), Max: 36.9 (07-16-21 @ 05:07)  HR: 108 (07-16-21 @ 08:15) (85 - 108)  BP: 111/68 (07-16-21 @ 08:15) (105/72 - 125/87)  RR: 15 (07-16-21 @ 08:15) (15 - 16)  SpO2: 99% (07-16-21 @ 08:15) (98% - 100%)  Height (cm): 152.4 (07-16-21 @ 05:07)  Weight (kg): 65.8 (07-16-21 @ 05:07)  BMI (kg/m2): 28.3 (07-16-21 @ 05:07)  BSA (m2): 1.63 (07-16-21 @ 05:07)  CAPILLARY BLOOD GLUCOSE        I&O's Summary      LABS:                        12.8   7.30  )-----------( 288      ( 16 Jul 2021 05:24 )             38.8     07-16    137  |  101  |  16  ----------------------------<  111<H>  3.5   |  30  |  0.80    Ca    9.3      16 Jul 2021 05:24  Mg     2.3     07-16    TPro  8.4<H>  /  Alb  3.8  /  TBili  0.3  /  DBili  x   /  AST  15  /  ALT  12  /  AlkPhos  75  07-16                RADIOLOGY & ADDITIONAL TESTS:    ECG Personally Reviewed -     Imaging Personally Reviewed:    Consultant(s) Notes Reviewed:      Care Discussed with Consultants/Other Providers:   (16 Jul 2021 08:45)      PAST MEDICAL & SURGICAL HISTORY:  No pertinent past medical history    History of appendectomy        FAMILY HISTORY:        MEDICATIONS   benzonatate 100 milliGRAM(s) Oral three times a day  enoxaparin Injectable 60 milliGRAM(s) SubCutaneous two times a day  guaiFENesin Oral Liquid (Sugar-Free) 100 milliGRAM(s) Oral every 6 hours PRN  hydrocodone/homatropine Syrup 5 milliLiter(s) Oral every 6 hours PRN      Vital Signs Last 24 Hrs  T(C): 36.4 (17 Jul 2021 05:29), Max: 36.7 (16 Jul 2021 13:34)  T(F): 97.5 (17 Jul 2021 05:29), Max: 98 (16 Jul 2021 13:34)  HR: 84 (17 Jul 2021 05:29) (70 - 94)  BP: 102/70 (17 Jul 2021 05:29) (86/59 - 109/74)  BP(mean): --  RR: 18 (17 Jul 2021 05:29) (18 - 18)  SpO2: 97% (17 Jul 2021 05:29) (95% - 99%)            LABS:                        12.8   7.30  )-----------( 288      ( 16 Jul 2021 05:24 )             38.8     07-16    137  |  101  |  16  ----------------------------<  111<H>  3.5   |  30  |  0.80    Ca    9.3      16 Jul 2021 05:24  Mg     2.3     07-16    TPro  8.4<H>  /  Alb  3.8  /  TBili  0.3  /  DBili  x   /  AST  15  /  ALT  12  /  AlkPhos  75  07-16              WBC:  WBC Count: 7.30 K/uL (07-16 @ 05:24)      MICROBIOLOGY:  RECENT CULTURES:                  Sodium:  Sodium, Serum: 137 mmol/L (07-16 @ 05:24)      0.80 mg/dL 07-16 @ 05:24      Hemoglobin:  Hemoglobin: 12.8 g/dL (07-16 @ 05:24)      Platelets: Platelet Count - Automated: 288 K/uL (07-16 @ 05:24)      LIVER FUNCTIONS - ( 16 Jul 2021 05:24 )  Alb: 3.8 g/dL / Pro: 8.4 gm/dL / ALK PHOS: 75 U/L / ALT: 12 U/L / AST: 15 U/L / GGT: x                 RADIOLOGY & ADDITIONAL STUDIES:

## 2021-07-17 NOTE — PROGRESS NOTE ADULT - PROBLEM SELECTOR PLAN 1
Thrombophilia workup was ordered.   Currently on lovenox full dose.  Change to NOAC now and discharge home in AM.  Patient is agreable with plan.

## 2021-07-17 NOTE — PROGRESS NOTE ADULT - SUBJECTIVE AND OBJECTIVE BOX
Patient is a 31y old  Female who presents with a chief complaint of cough x3 weeks (17 Jul 2021 09:21)    HPI:  Luisa Pollock is a 31 year old lady who had covid in Dec 2020, pfiezer vaccine in January, recently came back from a trip from [place] who presents with complaints of coughing for the last several weeks. The cough is w/o chest pain, f/c/n/v, she denies any recent colds. The patient also denies smoking. Patient states that her trips were in May and the cough started afterwards.         PAST MEDICAL & SURGICAL HISTORY:  No pertinent past medical history    History of appendectomy        Review of Systems:   CONSTITUTIONAL: No fever, weight loss, or fatigue  EYES: No eye pain, visual disturbances, or discharge  ENMT:  No difficulty hearing, tinnitus, vertigo; No sinus or throat pain  NECK: No pain or stiffness  RESPIRATORY: Cough, no wheezing, chills or hemoptysis; No shortness of breath  CARDIOVASCULAR: No chest pain, palpitations, dizziness, or leg swelling  GASTROINTESTINAL: No abdominal or epigastric pain. No nausea, vomiting, or hematemesis; No diarrhea or constipation. No melena or hematochezia.  GENITOURINARY: No dysuria, frequency, hematuria, or incontinence  NEUROLOGICAL: No headaches, memory loss, loss of strength, numbness, or tremors  SKIN: No itching, burning, rashes, or lesions   LYMPH NODES: No enlarged glands  ENDOCRINE: No heat or cold intolerance; No hair loss  MUSCULOSKELETAL: No joint pain or swelling; No muscle, back, or extremity pain  PSYCHIATRIC: No depression, anxiety, mood swings, or difficulty sleeping  HEME/LYMPH: No easy bruising, or bleeding gums  ALLERGY AND IMMUNOLOGIC: No hives or eczema    Allergies    No Known Allergies    Intolerances        Social History: Occasionally uses a vape pen, last used a few weeks ago. Denies heavy drinking or drug use    FAMILY HISTORY:  Maternal grandfather had bladder cancer.  Patient is not aware of any clotting issues in the family.     MEDICATIONS  (STANDING):  enoxaparin Injectable 60 milliGRAM(s) SubCutaneous Once    MEDICATIONS  (PRN):      T(C): 36.4 (07-16-21 @ 06:33), Max: 36.9 (07-16-21 @ 05:07)  HR: 108 (07-16-21 @ 08:15) (85 - 108)  BP: 111/68 (07-16-21 @ 08:15) (105/72 - 125/87)  RR: 15 (07-16-21 @ 08:15) (15 - 16)  SpO2: 99% (07-16-21 @ 08:15) (98% - 100%)  Height (cm): 152.4 (07-16-21 @ 05:07)  Weight (kg): 65.8 (07-16-21 @ 05:07)  BMI (kg/m2): 28.3 (07-16-21 @ 05:07)  BSA (m2): 1.63 (07-16-21 @ 05:07)  CAPILLARY BLOOD GLUCOSE        I&O's Summary      LABS:                        12.8   7.30  )-----------( 288      ( 16 Jul 2021 05:24 )             38.8     07-16    137  |  101  |  16  ----------------------------<  111<H>  3.5   |  30  |  0.80    Ca    9.3      16 Jul 2021 05:24  Mg     2.3     07-16    TPro  8.4<H>  /  Alb  3.8  /  TBili  0.3  /  DBili  x   /  AST  15  /  ALT  12  /  AlkPhos  75  07-16                RADIOLOGY & ADDITIONAL TESTS:    ECG Personally Reviewed -     Imaging Personally Reviewed:    Consultant(s) Notes Reviewed:      Care Discussed with Consultants/Other Providers:   (16 Jul 2021 08:45)    SUBJECTIVE & OBJECTIVE: Pt seen and examined at bedside by myself.  Patient c/o non productive cough and states that she had never been diagnosed with asthma in the past.  She was not taking any meds at home except for her IUD with progesterone content.  Patient denies any sob, chest pain, dizziness or headache or any fainting episode.  Patient is sitting up in bed, using her iPad.  Patient is calm, comfortable, pleasant and interactive    MEDICATIONS  (STANDING):  benzonatate 100 milliGRAM(s) Oral three times a day  enoxaparin Injectable 60 milliGRAM(s) SubCutaneous two times a day     PHYSICAL EXAM:  Vital Signs Last 24 Hrs  T(C): 36.3 (17 Jul 2021 11:28), Max: 36.5 (16 Jul 2021 23:30)  T(F): 97.4 (17 Jul 2021 11:28), Max: 97.7 (16 Jul 2021 23:30)  HR: 76 (17 Jul 2021 11:28) (70 - 84)  BP: 96/63 (17 Jul 2021 11:28) (86/59 - 104/60)  BP(mean): --  RR: 18 (17 Jul 2021 11:28) (18 - 18)  SpO2: 97% (17 Jul 2021 11:28) (97% - 98%)   Daily     Daily I&O's Detail    GENERAL: NAD, well-groomed, well-developed  HEAD:  Atraumatic, Normocephalic  EYES: EOMI, PERRLA, conjunctiva and sclera clear  ENMT: Moist mucous membranes  NECK: Supple, No JVD  NERVOUS SYSTEM:  Alert & Oriented X3, Motor Strength 5/5 B/L upper and lower extremities; DTRs 2+ intact and symmetric  CHEST/LUNG: Clear to auscultation bilaterally; No rales, rhonchi, wheezing, or rubs  HEART: Regular rate and rhythm; No murmurs, rubs, or gallops  ABDOMEN: Soft, Nontender, Nondistended; Bowel sounds present  EXTREMITIES:  2+ Peripheral Pulses, No clubbing, cyanosis, or edema  LABS:                        12.0   6.37  )-----------( 236      ( 17 Jul 2021 10:03 )             37.2   07-16    137  |  101  |  16  ----------------------------<  111<H>  3.5   |  30  |  0.80    Ca    9.3      16 Jul 2021 05:24  Mg     2.3     07-16    TPro  8.4<H>  /  Alb  3.8  /  TBili  0.3  /  DBili  x   /  AST  15  /  ALT  12  /  AlkPhos  75  07-16

## 2021-07-18 ENCOUNTER — TRANSCRIPTION ENCOUNTER (OUTPATIENT)
Age: 31
End: 2021-07-18

## 2021-07-18 VITALS
SYSTOLIC BLOOD PRESSURE: 101 MMHG | HEART RATE: 78 BPM | RESPIRATION RATE: 18 BRPM | TEMPERATURE: 98 F | DIASTOLIC BLOOD PRESSURE: 72 MMHG | OXYGEN SATURATION: 95 %

## 2021-07-18 LAB
AUTO DIFF PNL BLD: NEGATIVE — SIGNIFICANT CHANGE UP
C-ANCA SER-ACNC: NEGATIVE — SIGNIFICANT CHANGE UP
P-ANCA SER-ACNC: NEGATIVE — SIGNIFICANT CHANGE UP

## 2021-07-18 PROCEDURE — 99232 SBSQ HOSP IP/OBS MODERATE 35: CPT

## 2021-07-18 RX ORDER — RIVAROXABAN 15 MG-20MG
1 KIT ORAL
Qty: 42 | Refills: 0
Start: 2021-07-18 | End: 2021-08-07

## 2021-07-18 RX ADMIN — Medication 100 MILLIGRAM(S): at 05:57

## 2021-07-18 RX ADMIN — RIVAROXABAN 15 MILLIGRAM(S): KIT at 09:09

## 2021-07-18 NOTE — PROGRESS NOTE ADULT - ASSESSMENT
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  Echocardiogram is unremarkable for RV strain. VQ scan does not reveal any submassive PE. LE doppler in unremarkable for DVT.  Patient is hemodynamically stable at this time.    
    YASIR KATHLEEN 31 F 7/16/2021 1992 DR ROBERT MADRIGAL     REVIEW OF SYMPTOMS      Able to give ROS  Yes     RELIABLE +/-   CONSTITUTIONAL Weakness Yes  Chills No   ENDOCRINE  No heat or cold intolerance    ALLERGY No hives  Sore throat No stridor  RESP Coughing blood no  Shortness of breath YES   NEURO No Headache  Confusion Pain neck No   CARDIAC No Chest pain No Palpitations   GI  Pain abdomen NO   Vomiting NO     PHYSICAL EXAM    HEENT Unremarkable  atraumatic   RESP Fair air entry EXP prolonged    Harsh breath sound Resp distres mild   CARDIAC S1 S2 No S3     NO JVD    ABDOMEN SOFT BS PRESENT NOT DISTENDED No hepatosplenomegaly PEDAL EDEMA present No calf tenderness  NO rash                                        7/16/2021 ADMISSION CC.   31 f HO COVID 2020 then vaccine 1/2021 cc cough 3 w     7/16/2021 PRESENTING PROBLEMS .   CHRONIC COUGH  ABNORMAL CTA  CHR PE       ADVANCED DIRECTIVES.                            COVID STATUS.    scv2 7/16/2021 (-)       PATIENT DATA                ABG.                 OXYGENATION.       po 7/16/2021    ra 99%        VITALS/IO/VENT/DRIPS.     7/17/2021 afeb 76 92/56     ASSESSMENT/RECOMMENDATIONS.    CHRONIC COUGH   Check for asthma Check ige AEC   Should see me in offic e Call    Started symbicort 160   VTE  BNP 7/16/2021 BNP 12    cta ch 7/16/2021 no ac pe suspect chronic embolus distal r main pulm artery No pulm hytn   Patient has 2 risk factors   1) Is on hormonal birth control as per history  2) Had COVID 2020   So will manage as pe Started fd lvnx   Started thrombophilia barrios  VQ scan V duplx and echo were unremarkable   Patient may be DCed on 7/18 on rivaroxaban or apixaban and should see me in office  to follow as out patient         TIME SPENT   Over 25 minutes aggregate care time spent on encounter; activities included   direct patient care, counseling and/or coordinating care reviewing notes, lab data/ imaging , discussion with multidisciplinary team/ patient  /family and explaining in detail risks, benefits, alternatives  of the recommendations     YASIR KATHLEEN 31 F 7/16/2021 1992 DR ROBERT MADRIGAL     
    YASIR KATHLEEN 31 F 7/16/2021 1992 DR ROBERT MADRIGAL     REVIEW OF SYMPTOMS      Able to give ROS  Yes     RELIABLE +/-   CONSTITUTIONAL Weakness Yes  Chills No   ENDOCRINE  No heat or cold intolerance    ALLERGY No hives  Sore throat No stridor  RESP Coughing blood no  Shortness of breath YES   NEURO No Headache  Confusion Pain neck No   CARDIAC No Chest pain No Palpitations   GI  Pain abdomen NO   Vomiting NO     PHYSICAL EXAM    HEENT Unremarkable  atraumatic   RESP Fair air entry EXP prolonged    Harsh breath sound Resp distres mild   CARDIAC S1 S2 No S3     NO JVD    ABDOMEN SOFT BS PRESENT NOT DISTENDED No hepatosplenomegaly PEDAL EDEMA present No calf tenderness  NO rash                                      7/16/2021 ADMISSION CC.   31 f HO COVID 2020 then vaccine 1/2021 cc cough 3 w     7/16/2021 PRESENTING PROBLEMS .   CHRONIC COUGH  ABNORMAL CTA  CHR PE       ADVANCED DIRECTIVES.                            COVID STATUS.    scv2 7/16/2021 (-)       PATIENT DATA                ABG.                 OXYGENATION.       po 7/16/2021    ra 99%        VITALS/IO/VENT/DRIPS.     7/18/2021 afeb 70 100/70     ASSESSMENT/RECOMMENDATIONS.    CHRONIC COUGH   Check for asthma Check ige AEC   Should see me in offic e Call    Started symbicort 160     VTE  BNP 7/16/2021 BNP 12    cta ch 7/16/2021 no ac pe suspect chronic embolus distal r main pulm artery No pulm hytn   Patient has 2 risk factors   1) Is on hormonal birth control as per history  2) Had COVID 2020   So will manage as pe Started fd lvnx   Started thrombophilia barrios  VQ scan V duplx and echo were unremarkable   Patient may be DCed on 7/18 on rivaroxaban or apixaban and should see me in office  to follow as out patient       TIME SPENT   Over 25 minutes aggregate care time spent on encounter; activities included   direct patient care, counseling and/or coordinating care reviewing notes, lab data/ imaging , discussion with multidisciplinary team/ patient  /family and explaining in detail risks, benefits, alternatives  of the recommendations     YASIR KATHLEEN 31 F 7/16/2021 1992 DR ROBERT MADRIGAL

## 2021-07-18 NOTE — DISCHARGE NOTE PROVIDER - NSDCMRMEDTOKEN_GEN_ALL_CORE_FT
benzonatate 100 mg oral capsule: 1 cap(s) orally 3 times a day  guaiFENesin 100 mg/5 mL oral liquid: 5 milliliter(s) orally every 6 hours, As needed, Cough  rivaroxaban 15 mg oral tablet: 1 tab(s) orally 2 times a day (with meals)  rivaroxaban 20 mg oral tablet: 1 tab(s) orally once a day    benzonatate 100 mg oral capsule: 1 cap(s) orally 3 times a day  guaiFENesin 100 mg/5 mL oral liquid: 5 milliliter(s) orally every 6 hours, As needed, Cough  hydrocodone-homatropine 5 mg-1.5 mg/5 mL oral syrup: 5 milliliter(s) orally every 6 hours, As needed, Severe Cough MDD:20ml max  rivaroxaban 15 mg oral tablet: 1 tab(s) orally 2 times a day (with meals)  rivaroxaban 20 mg oral tablet: 1 tab(s) orally once a day

## 2021-07-18 NOTE — DISCHARGE NOTE PROVIDER - PROVIDER TOKENS
PROVIDER:[TOKEN:[[id number]],FOLLOWUP:[2 weeks]],FREE:[LAST:[PCP],PHONE:[(   )    -],FAX:[(   )    -]]

## 2021-07-18 NOTE — CHART NOTE - NSCHARTNOTEFT_GEN_A_CORE
Work Letter     To Whom It May Concern,    Please excuse Luisa Vázquez from work, as her family member was admitted on 07/16/21 and was treated in [facility]. Please excuse for the days she was hospitalized.     Any further questions or concerns please use contact info below.    Robert Anthony PA-C  Internal Medicine  [address] [place] [address] [phone] Work Letter     To Whom It May Concern,    Please excuse Luisa Vázquez from work, as her family member was admitted on 07/16/21 and was treated in [facility]. Please excuse for the days she was hospitalized. Miss Pollock will be able to return to work on 7/20/21.    Any further questions or concerns please use contact info below.    Robert Anthony PA-C  Internal Medicine  [address] [place] [address] [phone] Work Letter     To Whom It May Concern,    Please excuse Luisa Vázquez from work, as her patient was admitted on 07/16/21 and was treated in [facility]. Please excuse for the days she was hospitalized. Miss Pollock will be able to return to work on 7/20/21.    Any further questions or concerns please use contact info below.    Robert Anthony PA-C  Internal Medicine  [address] [place] [address] [phone]

## 2021-07-18 NOTE — DISCHARGE NOTE PROVIDER - CARE PROVIDER_API CALL
Milo Li)  Critical Care Medicine; Internal Medicine; Pulmonary Disease  [facility] [place] [address]  Phone: [phone]  Fax: [phone]  Follow Up Time: 2 weeks    PCP,   Phone: (   )    -  Fax: (   )    -  Follow Up Time:

## 2021-07-18 NOTE — DISCHARGE NOTE PROVIDER - HOSPITAL COURSE
Luisa Pollock is a 31 year old lady who had covid in Dec 2020, pfiezer vaccine in January, recently came back from a trip from [place] who presents with complaints of coughing for the last several weeks. The cough is w/o chest pain, f/c/n/v, she denies any recent colds. The patient also denies smoking. Patient states that her trips were in May and the cough started afterwards.     Pt found to have Pulmonary Embolism. Venous duplex negative, Thrombophilia workup started---will need to follow up outpatient with Pulmonologist Dr. Li.   Patient being discharged on Xarelto 15mg BID for first 21 days, then 20mg Po daily -will follow with Pulmonologist for further management .    Luisa Pollock is a 31 year old lady who had covid in Dec 2020, pfiezer vaccine in January, recently came back from a trip from [place] who presents with complaints of coughing for the last several weeks. The cough is w/o chest pain, f/c/n/v, she denies any recent colds. The patient also denies smoking. Patient states that her trips were in May and the cough started afterwards.     Pt found to have chronic Pulmonary Embolism. Venous duplex negative, ECHO -no acute abnormalities, Thrombophilia workup started---will need to follow up outpatient with Pulmonologist Dr. Li.   Patient being discharged on Xarelto 15mg BID for first 21 days, then 20mg Po daily -will follow with Pulmonologist for further management .     Vitals stable Luisa Pollock is a 31 year old lady who had covid in Dec 2020, pfiezer vaccine in January, recently came back from a trip from [place] who presents with complaints of coughing for the last several weeks. The cough is w/o chest pain, f/c/n/v, she denies any recent colds. The patient also denies smoking. Patient states that her trips were in May and the cough started afterwards.     Pt found to have chronic Pulmonary Embolism. Venous duplex negative, ECHO -no acute abnormalities, Thrombophilia workup started---will need to follow up outpatient with Pulmonologist Dr. Li.   Patient being discharged on Xarelto 15mg BID for first 21 days, then 20mg Po daily -will follow with Pulmonologist for further management .   Still with significant cough- cough meds prescribed  Vitals stable

## 2021-07-18 NOTE — DISCHARGE NOTE NURSING/CASE MANAGEMENT/SOCIAL WORK - NSDCPETBCESMAN_GEN_ALL_CORE
OT ACUTE Treatment                                                                                                                                                BASELINE STATUS COMPARED WITH CURRENT STATUS: Presents with ADL/IADL status below baseline , which was modified independent .    ASSESSMENT:  Treatment today focused on ADL and mobility at min assist with w/w . Current overall ADL status is minimal assist due to weakness/ fatigue.   Current functional mobility for IADL/ADL tasks is  minimal assist due to weakness/ fatigue. Patient displays  fair progress toward goals demonstrated by deficits noted above.    Limitations/Barriers at this time include weakness and fatigue. Patient will benefit from further skilled OT for continued training with ADL retraining and functional mobility  to help the patient meet goals as listed in functional data section below with d/c to SA.       RECOMMENDATIONS FOR DISCHARGE:  Recommendations for Discharge: OT: Less intensive rehab (02/06/17 6092)  OT Identified Barriers to Discharge: medical status   Equipment:  PT/OT Mobility Equipment for Discharge: continue to monitor- may benefit from 4ww (has standard walker) (02/06/17 5685)       PLAN: Continue skilled OT  Treatment Plan for Next Session: sinkside ADL incorporating E conserve        Frequency Comments: M-F() SF,Mague, BM/NR    AM-PAC Outcomes -Daily Activity Domain  How much help from another person does the patient currently need?*  Task Score  Norm   1. Putting on and taking off regular lower body clothing? 3 - A Little   2. Bathing (including washing, rinsing, drying)?   3 - A Little   3. Toileting, which includes using toilet, bedpan, or urinal? 4 - None   4. Putting on and taking off regular upper body clothing? 4 - None   5. Taking care of personal grooming such as brushing teeth? 4 - None   6. Eating meals?  4 - None   Raw Score: 22/24   Converted Score:  47.10 (Raw score = 22)   G code conversion NA   Converted  score >39.4 predictive of discharge to home  *Score based on clinical judgment/expected performance, may not have been performed during this session  Scoring Guidelines  1) Patient may use assistive devices unless otherwise indicated in question  2) Do not consider help for management of medical devices only (IV poles, catheters, NG, etc) as part of assist level  3) If patient requires device that substitutes for toileting or eating function (catheter, NG tube, PEG) they do not engage in these activities and are scored as Total  4) If activity was not observed and patient unable to do the activity, select \"Total\" .  If the patient can do the activity but was not directly observed, use profession judgment to determine how much assistance needed.    Diagnosis:  1. Closed fracture of multiple ribs of left side, initial encounter    2. Renal insufficiency    3. Hypothermia, initial encounter    4. Pneumonia of left lower lobe due to infectious organism    5. Renal failure (ARF), acute on chronic    6. Hypothermia not due to cold exposure    7. Closed fracture of multiple ribs of left side with routine healing, subsequent encounter    8. COPD exacerbation    9. Idiopathic hypotension    10. Bacterial pneumonia    11. Delirium    12. Perforated bowel        Co-morbidities:   Patient Active Problem List   Diagnosis   • Hypertension   • Osteopenia   • Closed fracture of multiple ribs of left side   • Pneumonia of left lower lobe due to infectious organism   • KIMMY (acute kidney injury)   • Perforated bowel   • COPD exacerbation       Precautions:  Precautions  Other Precautions: on RA  (02/05/17 0937)    Prior Living Situation:  Type of Home: Condo (01/26/17 1200)  Lives With: Alone (01/25/17 1136)    EDUCATION:   On this date, the patient was educated on ADL and mobility .    The response to education was: Needs reinforcement                                                    SUBJECTIVE:     Subjective: Pt is pleasant and  is coopertaive in therapy  (02/06/17 1545)       OBJECTIVE:     RN reported Sorenson Fall Scale Score: 85       Last 24 hours of Functional Data     ADLs  Self Cares/ADL's  Toileting Assistance: Supervision (02/06/17 1545)    Household mobility  Household Mobility  Bed to Chair: Supervision (02/06/17 1545)  Sit to Stand: Supervision (02/06/17 1545)  Stand to Sit: Supervision (02/06/17 1545)  Stand Pivot Transfers: Supervision (02/06/17 1545)  Toilet Transfers: Supervision (02/06/17 1545)  Transfer Equipment: with w/w  (02/06/17 1545)  Standing - Static: Supervision (02/06/17 1545)  Standing - Dynamic: Supervision (02/06/17 1545)    Home Management       Tolerance  OT Activity Tolerance  Activity Tolerance: 1:1 Activity to rest (02/06/17 1545)  Activity Tolerance Comments: fair with w/w  (02/06/17 1545)    Vitals       Cognition            Patient's Personal Goal: regain strength and activity tolerance (02/03/17 1100)    Therapy Goals  Goals  Short Term Goals to Be Reviewed On: 02/12/17 (02/05/17 0937)  Short Term Goals Are The Same as Discharge Goals: Yes (02/05/17 0937)  Goal Agreement: Patient agrees with goals and treatment plan (02/05/17 0937)  Grooming Discharge Goal 1: independent with 2 grooming task sin stance (02/05/17 0937)  Grooming Discharge Goal Progress 1: Outcome not met, continue to monitor (02/05/17 0937)  Bathing Discharge Goal 1: mod I spongebathing at sink (02/05/17 0937)  Bathing Discharge Goal Progress 1: Outcome not met, continue to monitor (02/05/17 0937)  Dressing Discharge Goal 1: mod I for UE and LE dressing (02/05/17 0937)  Dressing Discharge Goal Progress 1: Outcome not met, continue to monitor (02/05/17 0937)  Toileting Discharge Goal 1: mod I (02/05/17 0937)  Toileting Discharge Goal Progress 1: Outcome not met, continue to monitor (02/05/17 0937)  Home Setting Transfer Discharge Goal 1: household transfers at mod I (02/05/17 0937)  Home Setting Transfer Discharge Goal Progress 1: Outcome  not met, continue to monitor (02/05/17 5301)    Interventions and Treatment Time  Treatment Interventions: ADL retraining;UE strengthening/ROM;Endurance training;Functional transfer training (02/06/17 8145)  OT Time Spent: 39 minutes (02/06/17 1545)      See OT flowsheet for full details regarding the OT therapy provided.      If you are a smoker, it is important for your health to stop smoking. Please be aware that second hand smoke is also harmful.

## 2021-07-18 NOTE — PROGRESS NOTE ADULT - SUBJECTIVE AND OBJECTIVE BOX
LUISA COOLEY    [facility] [address]    Patient is a 31y old  Female who presents with a chief complaint of cough x3 weeks (17 Jul 2021 14:58)       Allergies    No Known Allergies    Intolerances        HPI:  Luisa Cooley is a 31 year old lady who had covid in Dec 2020, pfiezer vaccine in January, recently came back from a trip from [place] who presents with complaints of coughing for the last several weeks. The cough is w/o chest pain, f/c/n/v, she denies any recent colds. The patient also denies smoking. Patient states that her trips were in May and the cough started afterwards.         PAST MEDICAL & SURGICAL HISTORY:  No pertinent past medical history    History of appendectomy        Review of Systems:   CONSTITUTIONAL: No fever, weight loss, or fatigue  EYES: No eye pain, visual disturbances, or discharge  ENMT:  No difficulty hearing, tinnitus, vertigo; No sinus or throat pain  NECK: No pain or stiffness  RESPIRATORY: Cough, no wheezing, chills or hemoptysis; No shortness of breath  CARDIOVASCULAR: No chest pain, palpitations, dizziness, or leg swelling  GASTROINTESTINAL: No abdominal or epigastric pain. No nausea, vomiting, or hematemesis; No diarrhea or constipation. No melena or hematochezia.  GENITOURINARY: No dysuria, frequency, hematuria, or incontinence  NEUROLOGICAL: No headaches, memory loss, loss of strength, numbness, or tremors  SKIN: No itching, burning, rashes, or lesions   LYMPH NODES: No enlarged glands  ENDOCRINE: No heat or cold intolerance; No hair loss  MUSCULOSKELETAL: No joint pain or swelling; No muscle, back, or extremity pain  PSYCHIATRIC: No depression, anxiety, mood swings, or difficulty sleeping  HEME/LYMPH: No easy bruising, or bleeding gums  ALLERGY AND IMMUNOLOGIC: No hives or eczema    Allergies    No Known Allergies    Intolerances        Social History: Occasionally uses a vape pen, last used a few weeks ago. Denies heavy drinking or drug use    FAMILY HISTORY:  Maternal grandfather had bladder cancer.  Patient is not aware of any clotting issues in the family.     MEDICATIONS  (STANDING):  enoxaparin Injectable 60 milliGRAM(s) SubCutaneous Once    MEDICATIONS  (PRN):      T(C): 36.4 (07-16-21 @ 06:33), Max: 36.9 (07-16-21 @ 05:07)  HR: 108 (07-16-21 @ 08:15) (85 - 108)  BP: 111/68 (07-16-21 @ 08:15) (105/72 - 125/87)  RR: 15 (07-16-21 @ 08:15) (15 - 16)  SpO2: 99% (07-16-21 @ 08:15) (98% - 100%)  Height (cm): 152.4 (07-16-21 @ 05:07)  Weight (kg): 65.8 (07-16-21 @ 05:07)  BMI (kg/m2): 28.3 (07-16-21 @ 05:07)  BSA (m2): 1.63 (07-16-21 @ 05:07)  CAPILLARY BLOOD GLUCOSE        I&O's Summary      LABS:                        12.8   7.30  )-----------( 288      ( 16 Jul 2021 05:24 )             38.8     07-16    137  |  101  |  16  ----------------------------<  111<H>  3.5   |  30  |  0.80    Ca    9.3      16 Jul 2021 05:24  Mg     2.3     07-16    TPro  8.4<H>  /  Alb  3.8  /  TBili  0.3  /  DBili  x   /  AST  15  /  ALT  12  /  AlkPhos  75  07-16                RADIOLOGY & ADDITIONAL TESTS:    ECG Personally Reviewed -     Imaging Personally Reviewed:    Consultant(s) Notes Reviewed:      Care Discussed with Consultants/Other Providers:   (16 Jul 2021 08:45)      PAST MEDICAL & SURGICAL HISTORY:  No pertinent past medical history    History of appendectomy        FAMILY HISTORY:        MEDICATIONS   benzonatate 100 milliGRAM(s) Oral three times a day  guaiFENesin Oral Liquid (Sugar-Free) 100 milliGRAM(s) Oral every 6 hours PRN  hydrocodone/homatropine Syrup 5 milliLiter(s) Oral every 6 hours PRN  rivaroxaban 15 milliGRAM(s) Oral two times a day with meals      Vital Signs Last 24 Hrs  T(C): 36.6 (18 Jul 2021 05:03), Max: 36.6 (17 Jul 2021 17:00)  T(F): 97.8 (18 Jul 2021 05:03), Max: 97.9 (17 Jul 2021 17:00)  HR: 74 (18 Jul 2021 05:03) (74 - 88)  BP: 106/72 (18 Jul 2021 05:03) (92/56 - 110/73)  BP(mean): --  RR: 18 (18 Jul 2021 05:03) (18 - 18)  SpO2: 96% (18 Jul 2021 05:03) (96% - 98%)      07-17-21 @ 07:01  -  07-18-21 @ 07:00  --------------------------------------------------------  IN: 400 mL / OUT: 0 mL / NET: 400 mL            LABS:                        12.0   6.37  )-----------( 236      ( 17 Jul 2021 10:03 )             37.2                     WBC:  WBC Count: 6.37 K/uL (07-17 @ 10:03)  WBC Count: 7.30 K/uL (07-16 @ 05:24)      MICROBIOLOGY:  RECENT CULTURES:                  Sodium:  Sodium, Serum: 137 mmol/L (07-16 @ 05:24)      0.80 mg/dL 07-16 @ 05:24      Hemoglobin:  Hemoglobin: 12.0 g/dL (07-17 @ 10:03)  Hemoglobin: 12.8 g/dL (07-16 @ 05:24)      Platelets: Platelet Count - Automated: 236 K/uL (07-17 @ 10:03)  Platelet Count - Automated: 288 K/uL (07-16 @ 05:24)              RADIOLOGY & ADDITIONAL STUDIES:

## 2021-07-18 NOTE — DISCHARGE NOTE NURSING/CASE MANAGEMENT/SOCIAL WORK - PATIENT PORTAL LINK FT
You can access the FollowMyHealth Patient Portal offered by [facility] by registering at the following website: [email or web address]. By joining [employer]’s FollowMyHealth portal, you will also be able to view your health information using other applications (apps) compatible with our system.

## 2021-07-18 NOTE — DISCHARGE NOTE PROVIDER - NSDCCPCAREPLAN_GEN_ALL_CORE_FT
PRINCIPAL DISCHARGE DIAGNOSIS  Diagnosis: Other chronic pulmonary embolism without acute cor pulmonale  Assessment and Plan of Treatment:

## 2021-07-19 LAB
AT III ACT/NOR PPP CHRO: 81 % — LOW (ref 85–135)
PROT C ACT/NOR PPP: 90 % — SIGNIFICANT CHANGE UP (ref 74–150)
PROT S FREE AG PPP IA-ACNC: 101 % — SIGNIFICANT CHANGE UP (ref 61–131)

## 2021-07-22 DIAGNOSIS — R05 COUGH: ICD-10-CM

## 2021-07-22 DIAGNOSIS — I27.82 CHRONIC PULMONARY EMBOLISM: ICD-10-CM

## 2021-07-22 DIAGNOSIS — Z86.16 PERSONAL HISTORY OF COVID-19: ICD-10-CM

## 2021-07-22 PROBLEM — Z00.00 ENCOUNTER FOR PREVENTIVE HEALTH EXAMINATION: Status: ACTIVE | Noted: 2021-07-22

## 2021-07-26 LAB
DNA PLOIDY SPEC FC-IMP: SIGNIFICANT CHANGE UP
PTR INTERPRETATION: SIGNIFICANT CHANGE UP

## 2021-07-30 PROBLEM — Z78.9 OTHER SPECIFIED HEALTH STATUS: Chronic | Status: ACTIVE | Noted: 2021-07-16

## 2021-08-09 RX ORDER — RIVAROXABAN 15 MG-20MG
1 KIT ORAL
Qty: 30 | Refills: 0
Start: 2021-08-09 | End: 2021-09-07

## 2021-09-03 ENCOUNTER — APPOINTMENT (OUTPATIENT)
Dept: PULMONOLOGY | Facility: CLINIC | Age: 31
End: 2021-09-03
Payer: COMMERCIAL

## 2021-09-03 ENCOUNTER — TRANSCRIPTION ENCOUNTER (OUTPATIENT)
Age: 31
End: 2021-09-03

## 2021-09-03 VITALS
HEART RATE: 75 BPM | SYSTOLIC BLOOD PRESSURE: 98 MMHG | TEMPERATURE: 97.9 F | OXYGEN SATURATION: 98 % | DIASTOLIC BLOOD PRESSURE: 60 MMHG | HEIGHT: 60 IN | WEIGHT: 147 LBS | BODY MASS INDEX: 28.86 KG/M2

## 2021-09-03 DIAGNOSIS — R06.02 SHORTNESS OF BREATH: ICD-10-CM

## 2021-09-03 PROCEDURE — 99204 OFFICE O/P NEW MOD 45 MIN: CPT | Mod: 25

## 2021-09-03 PROCEDURE — 36415 COLL VENOUS BLD VENIPUNCTURE: CPT

## 2021-09-03 NOTE — DISCUSSION/SUMMARY
[FreeTextEntry1] : \par __________________________________\par PATIENT SUMMARY.\par ____________________________________\par      \par INITIAL VISIT 9/3/2021 .            \par 31 f was at [facility] for PE  diagnosed on CTA 2021 and was referred for Pulm followup                \par                    \par \par ____________\par PATIENT DATA\par ___________________\par \par AGE.     31 [birth year]                  \par SEX.          f      \par ALLERGY.     nka             \par SMOKING HISTORY.  never smoker Has been around people who vape \par OCCUPATION. nurse \par PETS. no\par FAM HO CANCER. grandfather had bladder ca  in his 60s \par FAMILY HO VTE. no \par FAM HO ASTHMA. no \par BIRTHPLACE.      [place] 4 \par ASBESTOS EXPOSURE. no \par TB EXPOSURE.   no\par \par DYSPNEA SCORE. \par 9/3/2021 [facility] 1  Short of breath after climbing up 1 flight stairs \par PEAK FLOW. \par PULSE OXIMETRY. 9/3/2021 ra rest 98%\par HOME OXYGEN.  \par HOME NIV.  \par HOME NEBUL.    \par BP. 9/3/2021 98/60 75 \par WEIGHT.    9/3/2021 147 \par BMI.    9/3/2021 28\par EDS. 9/3/2021 no \par 9/3/2021 SB score 2 \par STOP BANG SCORE.\par SNORE 9/3/2021 Yes\par TIRED 9/3/2021 Yes \par OBSERVED 9/3/2021 No \par BP 9/3/2021 No \par BMI OVER 35 9/3/2021 nO \par AGE OVER 55 9/3/2021 NO \par NECH OVER 16 9/3/2021 NO \par GENDER MALE 9/3/2021 nO   \par \par FLU VACCINE.   Flu 10/2021\par PNEUMONIA VACCINE.\par COVID VACCINE. PFIZER 2 SHOTS 2021 \par COVID STATUS. Had covid 2020\par  \par CONTACT.    [phone]\par INITIAL VISIT.  9/3/2021 \par PCP.  Dr Tiffany Garrett \par \par ___________\par PROBLEM LIST\par ______________\par     \par PULMONARY EMBOLISM \par QUANTIFERON POSITIVE\par COVID 2020 MANAGED AT HOME \par COVID VACCINE 2021 PFIZER \par \par

## 2021-09-03 NOTE — ASSESSMENT
[FreeTextEntry1] : \par \par  ___________________\par BEST PRACTICE ISSUES  ASSESSMENT/RECOMMEND (A/R)\par _____________________\par \par NEED FOR HOME OXYGEN.   No\par OBESITY. No \par IMMUNIZATION. UPTODATE WITH FLU \par UPTODATE WITH COVID \par LUNG CANCER SCREENING ELIGIBILITY. NOT ELIGIBLE \par SMOKING STATUS. NO \par SLEEP APNEA. 9/3/2021 SB SCORE 2 \par \par ______________\par VENOUS THROMBOEMBOLISM 7/2021)\par ______________________\par \par HISTORY\par Admitted 7/16/2021 [facility] with cough ruled in for pe \par \par RISK FACTORS\par Had COVID 12/2020\par Has IUD with progestin \par \par VTE \par d-dimr 7/16 (-) \par BNP 7/16/2021 BNP 12  \par cta ch 7/16/2021 no ac pe suspect chronic embolus distal r main pulm artery No pulm hytn \par VQ 7/17/2021 N \par ECHO 7/17/2021 ef 60% dd1 n rv size and funtion \par V duplx 7/17/2021 (-) \par \par \par THROMBOPHILIA MENDOZA\par Homocysteine 7/16 6.7 \par \par RENAL \par Cr 7/16/2021 Cr .8 \par \par PREGNANCY STATUS\par HCG 7/16/2021 (-) \par \par \par ASSESSMENT RECOMMENDATION\par 9/3/2021 Patient had non provoked pulm embolism \par In young patients this is often caused by underlying thrombophilia so will refer Hemat\par She did have COVID 12/2020\par 9/3/2021 Pt was started on Xarelto mid July 2021 so will recommend full dose anticoagn at least for 6 m ie till Jan 2022 and will reevauate at that point to see if anticoagulation should be continues \par 9/3/2021 She is a low risk for bleed \par 9/3/2021 I ting warned her about drug interactions and to dw her gynecologist re IUD hormones and possible interaction with Xarelto and to see if different contraceptibe should be advised \par 9/3/2021 I have advsed her to let us now if she goes on new meds \par 9/3/2021 She still co dyspnea so will refer for PFTS and ECHO  We may need VQ scan \par 9/3/2021  1) hemat 2) echo 3) PFTS 4) COVID 5) RTC 3 w\par \par \par \par

## 2021-09-03 NOTE — CONSULT LETTER
[Dear  ___] : Dear  [unfilled], [Consult Letter:] : I had the pleasure of evaluating your patient, [unfilled]. [Please see my note below.] : Please see my note below. [Consult Closing:] : Thank you very much for allowing me to participate in the care of this patient.  If you have any questions, please do not hesitate to contact me. [FreeTextEntry3] : Yours truly,\par \par Milo Li MD

## 2021-09-03 NOTE — REASON FOR VISIT
[Initial] : an initial visit [Pulmonary Embolism] : pulmonary embolism [Shortness of Breath] : shortness of breath [TextBox_13] : DR TYREE MCKINLEY

## 2021-09-04 LAB
A1AT SERPL-MCNC: 147 MG/DL
ALBUMIN SERPL ELPH-MCNC: 4.5 G/DL
ALP BLD-CCNC: 66 U/L
ALT SERPL-CCNC: 15 U/L
ANION GAP SERPL CALC-SCNC: 12 MMOL/L
AST SERPL-CCNC: 16 U/L
BASOPHILS # BLD AUTO: 0.02 K/UL
BASOPHILS NFR BLD AUTO: 0.3 %
BILIRUB SERPL-MCNC: 0.5 MG/DL
BUN SERPL-MCNC: 13 MG/DL
CALCIUM SERPL-MCNC: 9.7 MG/DL
CHLORIDE SERPL-SCNC: 102 MMOL/L
CO2 SERPL-SCNC: 22 MMOL/L
CREAT SERPL-MCNC: 0.74 MG/DL
DEPRECATED D DIMER PPP IA-ACNC: <150 NG/ML DDU
EOSINOPHIL # BLD AUTO: 0.05 K/UL
EOSINOPHIL # BLD MANUAL: 60 /UL
EOSINOPHIL NFR BLD AUTO: 0.7 %
GLUCOSE SERPL-MCNC: 87 MG/DL
HCT VFR BLD CALC: 39.9 %
HGB BLD-MCNC: 12.9 G/DL
IMM GRANULOCYTES NFR BLD AUTO: 0.3 %
LYMPHOCYTES # BLD AUTO: 2.38 K/UL
LYMPHOCYTES NFR BLD AUTO: 31 %
MAN DIFF?: NORMAL
MCHC RBC-ENTMCNC: 30.2 PG
MCHC RBC-ENTMCNC: 32.3 GM/DL
MCV RBC AUTO: 93.4 FL
MONOCYTES # BLD AUTO: 0.46 K/UL
MONOCYTES NFR BLD AUTO: 6 %
NEUTROPHILS # BLD AUTO: 4.75 K/UL
NEUTROPHILS NFR BLD AUTO: 61.7 %
NT-PROBNP SERPL-MCNC: 22 PG/ML
PLATELET # BLD AUTO: 242 K/UL
POTASSIUM SERPL-SCNC: 4.4 MMOL/L
PROT SERPL-MCNC: 7.5 G/DL
RBC # BLD: 4.27 M/UL
RBC # FLD: 12.8 %
SODIUM SERPL-SCNC: 136 MMOL/L
WBC # FLD AUTO: 7.68 K/UL

## 2021-09-08 ENCOUNTER — NON-APPOINTMENT (OUTPATIENT)
Age: 31
End: 2021-09-08

## 2021-09-08 LAB
AT III PPP CHRO-ACNC: 93 %
B2 GLYCOPROT1 AB SER QL: NEGATIVE
CARDIOLIPIN AB SER IA-ACNC: POSITIVE
CARDIOLIPIN IGM SER-MCNC: 20.9 MPL
CARDIOLIPIN IGM SER-MCNC: <5 GPL
DSDNA AB SER-ACNC: 12 IU/ML
HIV1+2 AB SPEC QL IA.RAPID: NONREACTIVE
M TB IFN-G BLD-IMP: NEGATIVE
PROT S AG ACT/NOR PPP IA: 92 %
QUANTIFERON TB PLUS MITOGEN MINUS NIL: 9.67 IU/ML
QUANTIFERON TB PLUS NIL: 0.02 IU/ML
QUANTIFERON TB PLUS TB1 MINUS NIL: 0.01 IU/ML
QUANTIFERON TB PLUS TB2 MINUS NIL: 0.01 IU/ML
TOTAL IGE SMQN RAST: 31 KU/L

## 2021-09-10 ENCOUNTER — APPOINTMENT (OUTPATIENT)
Dept: PULMONOLOGY | Facility: CLINIC | Age: 31
End: 2021-09-10
Payer: COMMERCIAL

## 2021-09-10 VITALS
BODY MASS INDEX: 28.86 KG/M2 | DIASTOLIC BLOOD PRESSURE: 62 MMHG | HEIGHT: 60 IN | HEART RATE: 87 BPM | SYSTOLIC BLOOD PRESSURE: 93 MMHG | TEMPERATURE: 98.1 F | OXYGEN SATURATION: 98 % | WEIGHT: 147 LBS

## 2021-09-10 LAB — DEPRECATED CARDIOLIPIN IGA SER: 5.2 APL

## 2021-09-10 PROCEDURE — 99214 OFFICE O/P EST MOD 30 MIN: CPT

## 2021-09-10 NOTE — ASSESSMENT
[FreeTextEntry1] : \par \par ______________\par VENOUS THROMBOEMBOLISM 7/2021)\par ______________________\par \par PATIENT DATA \par Admitted 7/16/2021 [facility] with cough ruled in for pe \par RISK FACTORS\par Had COVID 12/2020\par Has IUD with progestin \par HYPERCOAG MENDOZA\par 9/3/2021 Cardiolipin ab (+) \par Cardiolipin IgM 9/3/2021 31 (high) \par Cardiolipin IgA 9/3 (-) \par b2g 9/8/2021 (-) \par ATIII 9/3/2021 N 93% \par ds dna 9/3/2021 (-) \par ige 9/3/2021 31 (-)\par protein s 9/3/2021 92% \par VTE \par d-dimr 7/16 (-) \par BNP 7/16/2021 BNP 12  \par cta ch 7/16/2021 no ac pe suspect chronic embolus distal r main pulm artery No pulm hytn \par VQ 7/17/2021 N \par ECHO 7/17/2021 ef 60% dd1 n rv size and funtion \par V duplx 7/17/2021 (-) \par \par \par \par ASSESSMENT RECOMMENDATION\par 9/10/2021 Pt was exaplanined that she may have APLA and in that case we may have to change her to coumadin but in order to be certain she does have APLA need hemat eval and guidance \par Meabnwhile she nows to come to er if short of breath chest pain etc \par 9/10/2021 Pt has only one more pill of xarelto left and I sent in script and explaine dto her that shwe should plan in such a way that sh NEVER runs out of Xarelt \par 9/10/2021 RTC 6 weeks \par 9/10/2021 Still has IUD and pt was advised to dw Gynec \par \par \par \par \par

## 2021-09-10 NOTE — DISCUSSION/SUMMARY
[FreeTextEntry1] : \par ____________\par PATIENT DATA\par ___________________\par \par AGE.     31 ([birth year]                  \par SEX.          f      \par ALLERGY.     nka             \par SMOKING HISTORY.  never smoker Has been around people who vape \par OCCUPATION. nurse \par PETS. no\par FAM HO CANCER. grandfather had bladder ca  in his 60s \par FAMILY HO VTE. no \par FAM HO ASTHMA. no \par BIRTHPLACE.      [place]  [place] 4 \par ASBESTOS EXPOSURE. no \par TB EXPOSURE.   no\par DYSPNEA SCORE. \par 9/10/2021 MMRC1 2 flight dyspnea \par 9/3/2021 [facility] 1  Short of breath after climbing up 1 flight stairs \par PEAK FLOW. \par PULSE OXIMETRY. 9/10/2021 ra rest 98% 9/3/2021 ra rest 98%\par HOME OXYGEN.  \par HOME NIV.  \par HOME NEBUL.    \par BP. 9/10/2021 93/62 9/3/2021 98/60 75 \par WEIGHT.    9/3/2021 147 \par BMI.    9/10/2021 28 9/3/2021 28\par EDS. 9/3/2021 no \par 9/3/2021 SB score 2 \par STOP BANG SCORE.\par SNORE 9/3/2021 Yes\par TIRED 9/3/2021 Yes \par OBSERVED 9/3/2021 No \par BP 9/3/2021 No \par BMI OVER 35 9/3/2021 nO \par AGE OVER 55 9/3/2021 NO \par NECH OVER 16 9/3/2021 NO \par GENDER MALE 9/3/2021 nO   \par \par FLU VACCINE.   Flu 10/2021\par PNEUMONIA VACCINE.\par COVID VACCINE. PFIZER 2 SHOTS 2021 \par COVID STATUS. Had covid 2020\par  \par CONTACT.    [phone]\par INITIAL VISIT.  9/3/2021 \par PCP.  Dr Tiffany Garrett \par \par ___________\par PROBLEM LIST\par ______________\par     \par DYSPNEA\par PULMONARY EMBOLISM \par CARDIOLIPIN AB (+) 9/3 \par Cardiolipin IgM 9/3/2021 31 (high) \par QUANTIFERON POSITIVE\par COVID 2020 MANAGED AT HOME \par COVID VACCINE 2021 PFIZER \par \par \par

## 2021-09-10 NOTE — REASON FOR VISIT
[Follow-Up] : a follow-up visit [Pulmonary Embolism] : pulmonary embolism [TextBox_13] :  Doesnt have PCP

## 2021-09-12 LAB — DNA PLOIDY SPEC FC-IMP: NORMAL

## 2021-09-23 DIAGNOSIS — Z01.818 ENCOUNTER FOR OTHER PREPROCEDURAL EXAMINATION: ICD-10-CM

## 2021-10-01 ENCOUNTER — APPOINTMENT (OUTPATIENT)
Dept: DISASTER EMERGENCY | Facility: CLINIC | Age: 31
End: 2021-10-01

## 2021-10-06 ENCOUNTER — APPOINTMENT (OUTPATIENT)
Dept: INTERNAL MEDICINE | Facility: CLINIC | Age: 31
End: 2021-10-06

## 2021-10-15 ENCOUNTER — APPOINTMENT (OUTPATIENT)
Dept: PULMONOLOGY | Facility: CLINIC | Age: 31
End: 2021-10-15

## 2021-10-27 ENCOUNTER — RX RENEWAL (OUTPATIENT)
Age: 31
End: 2021-10-27

## 2021-10-27 RX ORDER — RIVAROXABAN 20 MG/1
20 TABLET, FILM COATED ORAL
Qty: 30 | Refills: 2 | Status: ACTIVE | COMMUNITY
Start: 2021-09-10 | End: 1900-01-01

## 2021-11-05 ENCOUNTER — OUTPATIENT (OUTPATIENT)
Dept: OUTPATIENT SERVICES | Facility: HOSPITAL | Age: 31
LOS: 1 days | Discharge: HOME | End: 2021-11-05

## 2021-11-05 DIAGNOSIS — Z90.49 ACQUIRED ABSENCE OF OTHER SPECIFIED PARTS OF DIGESTIVE TRACT: Chronic | ICD-10-CM

## 2021-11-05 DIAGNOSIS — D68.61 ANTIPHOSPHOLIPID SYNDROME: ICD-10-CM

## 2021-11-08 ENCOUNTER — APPOINTMENT (OUTPATIENT)
Dept: HEMATOLOGY ONCOLOGY | Facility: CLINIC | Age: 31
End: 2021-11-08
Payer: COMMERCIAL

## 2021-11-08 ENCOUNTER — RESULT REVIEW (OUTPATIENT)
Age: 31
End: 2021-11-08

## 2021-11-08 VITALS
BODY MASS INDEX: 28.48 KG/M2 | TEMPERATURE: 97.7 F | OXYGEN SATURATION: 99 % | WEIGHT: 145.06 LBS | SYSTOLIC BLOOD PRESSURE: 91 MMHG | RESPIRATION RATE: 18 BRPM | DIASTOLIC BLOOD PRESSURE: 67 MMHG | HEART RATE: 79 BPM | HEIGHT: 60 IN

## 2021-11-08 DIAGNOSIS — Z78.9 OTHER SPECIFIED HEALTH STATUS: ICD-10-CM

## 2021-11-08 DIAGNOSIS — D68.61 ANTIPHOSPHOLIPID SYNDROME: ICD-10-CM

## 2021-11-08 DIAGNOSIS — I26.99 OTHER PULMONARY EMBOLISM W/OUT ACUTE COR PULMONALE: ICD-10-CM

## 2021-11-08 LAB
BASOPHILS # BLD AUTO: 0.02 K/UL — SIGNIFICANT CHANGE UP (ref 0–0.2)
BASOPHILS NFR BLD AUTO: 0.3 % — SIGNIFICANT CHANGE UP (ref 0–2)
EOSINOPHIL # BLD AUTO: 0.1 K/UL — SIGNIFICANT CHANGE UP (ref 0–0.5)
EOSINOPHIL NFR BLD AUTO: 1.5 % — SIGNIFICANT CHANGE UP (ref 0–6)
HCT VFR BLD CALC: 36.9 % — SIGNIFICANT CHANGE UP (ref 34.5–45)
HGB BLD-MCNC: 12.1 G/DL — SIGNIFICANT CHANGE UP (ref 11.5–15.5)
IMM GRANULOCYTES NFR BLD AUTO: 0.3 % — SIGNIFICANT CHANGE UP (ref 0–1.5)
LYMPHOCYTES # BLD AUTO: 2.35 K/UL — SIGNIFICANT CHANGE UP (ref 1–3.3)
LYMPHOCYTES # BLD AUTO: 34.5 % — SIGNIFICANT CHANGE UP (ref 13–44)
MCHC RBC-ENTMCNC: 30 PG — SIGNIFICANT CHANGE UP (ref 27–34)
MCHC RBC-ENTMCNC: 32.8 G/DL — SIGNIFICANT CHANGE UP (ref 32–36)
MCV RBC AUTO: 91.6 FL — SIGNIFICANT CHANGE UP (ref 80–100)
MONOCYTES # BLD AUTO: 0.56 K/UL — SIGNIFICANT CHANGE UP (ref 0–0.9)
MONOCYTES NFR BLD AUTO: 8.2 % — SIGNIFICANT CHANGE UP (ref 2–14)
NEUTROPHILS # BLD AUTO: 3.77 K/UL — SIGNIFICANT CHANGE UP (ref 1.8–7.4)
NEUTROPHILS NFR BLD AUTO: 55.2 % — SIGNIFICANT CHANGE UP (ref 43–77)
NRBC # BLD: 0 /100 WBCS — SIGNIFICANT CHANGE UP (ref 0–0)
PLATELET # BLD AUTO: 225 K/UL — SIGNIFICANT CHANGE UP (ref 150–400)
RBC # BLD: 4.03 M/UL — SIGNIFICANT CHANGE UP (ref 3.8–5.2)
RBC # FLD: 12.7 % — SIGNIFICANT CHANGE UP (ref 10.3–14.5)
WBC # BLD: 6.82 K/UL — SIGNIFICANT CHANGE UP (ref 3.8–10.5)
WBC # FLD AUTO: 6.82 K/UL — SIGNIFICANT CHANGE UP (ref 3.8–10.5)

## 2021-11-08 PROCEDURE — 99204 OFFICE O/P NEW MOD 45 MIN: CPT

## 2021-11-08 RX ORDER — RIVAROXABAN 15 MG/1
15 TABLET, FILM COATED ORAL
Qty: 42 | Refills: 0 | Status: COMPLETED | COMMUNITY
Start: 2021-07-18

## 2021-11-12 LAB
APTT BLD: 45.3 SEC
CARDIOLIPIN AB SER IA-ACNC: POSITIVE
CARDIOLIPIN IGM SER-MCNC: 14.2 MPL
CARDIOLIPIN IGM SER-MCNC: <5 GPL
CONFIRM: 58.9 SEC
DRVVT IMM 1:2 NP PPP: ABNORMAL
DRVVT SCREEN TO CONFIRM RATIO: 1.69 RATIO
INR PPP: 2.26 RATIO
PROT C PPP CHRO-ACNC: 90 %
PT BLD: 25.7 SEC
SCREEN DRVVT: 134.2 SEC
SILICA CLOTTING TIME INTERPRETATION: NORMAL
SILICA CLOTTING TIME S/C: 1.07 RATIO

## 2021-11-12 NOTE — HISTORY OF PRESENT ILLNESS
[de-identified] : Luisa Pollock is a 32 yo F. who had covid-19 in Dec 2020, Pfizer vaccine in January, and found to have chronic PE on 7/16/21 at [facility] after a 3 week h/o dry cough. Bilateral venous duplex were negative, and ECHO without acute abnormality. Inpatient hypercoagulable workup: negative Factor V Leiden and prothrombin gene mutation, normal protein C/S, antithrombin III mildly low at 81% but normal on repeat at 93%. Beta 2 Glycoprotein screen was negative, anticardiolipin IgM mildly elevated at 20.9 MPL on 9/3/21. She was discharged on Xarelto and is currently taking 20mg daily. The cough resolved shortly after being discharged. No prior h/o of thrombosis or miscarriages. No family h/o thrombosis. States she feels well today, notes mild sob with exertion- chronic, dating prior to the PE and joint aches mostly after work. No h/o autoimmune disease. States her periods are largely light and irregular, did have 1-2 days of heavy bleeding last month unexpectedly, has Analilia IUD in place. No bleeding to any other sites\par \par PMH: No significant medical history\par FMH: Denies family h/o thrombosis and autoimmune illness. Maternal grandmother h/o DM. Paternal grandfather h/o bladder CA. 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 Social alcohol use\par Medication: Xarelto 20mg daily\par Allergies: NKDA\par Health maintenance: Up to date with PCP and GYN visits. Received Pfizer covid-19 vaccines\par \par

## 2021-11-12 NOTE — ASSESSMENT
[FreeTextEntry1] : This is a  31  year old woman with a history of COVID 19 in December 2020, patient later found to have a chronic PE in July 17th at [facility] after a 3 week history of dry cough.  Had a hypercoagulable state workup that was marginally positive.  ATIII mildly low at 81% but normal on repeat at 93%. Beta 2 Glycoprotein screen was negative, anticardiolipin IgM mildly elevated at 20.9 MPL on 9/3/21. She was discharged on Xarelto and is currently taking 20mg daily.   Patient presets to hematology on recommendations for the elevated Anti cardiolipin IgM.  The level of 20.9 units is not significantly high enough to rule in for anti phospholipid syndrome, which would need a level of >> 40 on 2 separate occasions 12 weeks apart.  Will repeat these lab values along with a protein C that was not run during the initial evaluation.  THough the notice of the PE was temporal remote from the COVID 19 infection, it was already chronic at the time of the discovery.  It could have been provoked by the initial COVID 19 infection or a few months afterwards and simply not detected until more recently.  Can consider discontinuation of the anticoagulation after 6 months of A/C unless there is a change in the anticardiolipin antibodies.  \par \par Addendum 11/12/21\par Anticardiolipin IgM 14.2MPL not high enough to make criteria for APLS, however there is a positive Silica clotting time which is likely due to the Xarelto itself.  Given thrombosis known on 7/16/21 and Xarelto started at that point, would continue Xarelto until January 15th when she can stop it, follow  in February to re-evaluate, recheck the Silica clotting time at that point.

## 2022-02-13 ENCOUNTER — OUTPATIENT (OUTPATIENT)
Dept: OUTPATIENT SERVICES | Facility: HOSPITAL | Age: 32
LOS: 1 days | Discharge: HOME | End: 2022-02-13

## 2022-02-13 DIAGNOSIS — D68.61 ANTIPHOSPHOLIPID SYNDROME: ICD-10-CM

## 2022-02-13 DIAGNOSIS — Z90.49 ACQUIRED ABSENCE OF OTHER SPECIFIED PARTS OF DIGESTIVE TRACT: Chronic | ICD-10-CM

## 2022-02-16 ENCOUNTER — APPOINTMENT (OUTPATIENT)
Dept: HEMATOLOGY ONCOLOGY | Facility: CLINIC | Age: 32
End: 2022-02-16

## 2022-03-03 ENCOUNTER — TRANSCRIPTION ENCOUNTER (OUTPATIENT)
Age: 32
End: 2022-03-03

## 2022-05-05 NOTE — PHYSICAL EXAM
Patient called for testing results.   Discussed results / patient on 1 year recall.  She understands to call if her vision changes and she would like to have cataracts evaluated.      [No Acute Distress] : no acute distress [Normal Oropharynx] : normal oropharynx [Normal Appearance] : normal appearance [Normal Rate/Rhythm] : normal rate/rhythm [No Neck Mass] : no neck mass [Normal S1, S2] : normal s1, s2 [No Murmurs] : no murmurs [No Resp Distress] : no resp distress [Clear to Auscultation Bilaterally] : clear to auscultation bilaterally [No Abnormalities] : no abnormalities [Benign] : benign [Normal Gait] : normal gait [No Clubbing] : no clubbing [No Cyanosis] : no cyanosis [No Edema] : no edema [FROM] : FROM [Normal Color/ Pigmentation] : normal color/ pigmentation [No Focal Deficits] : no focal deficits [Oriented x3] : oriented x3 [Normal Affect] : normal affect

## 2024-04-30 NOTE — ED ADULT NURSE NOTE - PMH
Samples Given: Arazlo Discontinue Regimen: spironolactone 100 mg tablet. Take one PO QD with water (d/t irregular periods and minimal improvement)\\ntretinoin 0.025 % topical cream (d/t irritation) Initiate Treatment: azelaic acid 15 % topical gel -Apply pea to dime size amount to whole face every other night working up to every night as tolerated (alternating with Arazlo samples if tolerated)\\nSeysara 150 mg tablet -Take one tablet by mouth daily Detail Level: Zone Render In Strict Bullet Format?: No No pertinent past medical history <<----- Click to add NO pertinent Past Medical History Initiate Treatment: desonide 0.05 % topical ointment -Apply topically to affected eyelid areas BID x 2 weeks, stop, repeat only as needed.

## 2024-10-04 NOTE — H&P ADULT - NSHPPOADEEPVENOUSTHROMB_GEN_A_CORE
Head , normocephalic , atraumatic , Face , Face within normal limits , Ears , External ears within normal limits , Nose/Nasopharynx , External nose  normal appearance , Mouth and Throat , Oral cavity appearance normal no